# Patient Record
Sex: MALE | Race: BLACK OR AFRICAN AMERICAN | Employment: UNEMPLOYED | URBAN - METROPOLITAN AREA
[De-identification: names, ages, dates, MRNs, and addresses within clinical notes are randomized per-mention and may not be internally consistent; named-entity substitution may affect disease eponyms.]

---

## 2017-02-06 ENCOUNTER — OFFICE VISIT (OUTPATIENT)
Dept: INTERNAL MEDICINE CLINIC | Age: 67
End: 2017-02-06

## 2017-02-06 VITALS
RESPIRATION RATE: 16 BRPM | HEART RATE: 59 BPM | HEIGHT: 68 IN | SYSTOLIC BLOOD PRESSURE: 138 MMHG | WEIGHT: 147 LBS | OXYGEN SATURATION: 98 % | TEMPERATURE: 97.9 F | BODY MASS INDEX: 22.28 KG/M2 | DIASTOLIC BLOOD PRESSURE: 74 MMHG

## 2017-02-06 DIAGNOSIS — Z09 HOSPITAL DISCHARGE FOLLOW-UP: Primary | ICD-10-CM

## 2017-02-06 DIAGNOSIS — R55 SYNCOPE, UNSPECIFIED SYNCOPE TYPE: ICD-10-CM

## 2017-02-06 DIAGNOSIS — M15.9 PRIMARY OSTEOARTHRITIS INVOLVING MULTIPLE JOINTS: ICD-10-CM

## 2017-02-06 DIAGNOSIS — Z76.0 MEDICATION REFILL: ICD-10-CM

## 2017-02-06 DIAGNOSIS — I73.9 CLAUDICATION (HCC): ICD-10-CM

## 2017-02-06 RX ORDER — LANOLIN ALCOHOL/MO/W.PET/CERES
100 CREAM (GRAM) TOPICAL
COMMUNITY
Start: 2017-01-18 | End: 2018-07-05

## 2017-02-06 RX ORDER — SUCRALFATE 1 G/1
1 TABLET ORAL
COMMUNITY
Start: 2017-01-18 | End: 2017-02-17

## 2017-02-06 RX ORDER — OMEPRAZOLE 20 MG/1
20 CAPSULE, DELAYED RELEASE ORAL
COMMUNITY
Start: 2017-01-18 | End: 2018-05-09

## 2017-02-06 RX ORDER — GUAIFENESIN 100 MG/5ML
81 LIQUID (ML) ORAL
COMMUNITY
Start: 2017-01-20

## 2017-02-06 RX ORDER — OXYCODONE AND ACETAMINOPHEN 7.5; 325 MG/1; MG/1
2 TABLET ORAL
Qty: 60 TAB | Refills: 0 | Status: SHIPPED | OUTPATIENT
Start: 2017-02-06 | End: 2017-06-22

## 2017-02-06 RX ORDER — FOLIC ACID 1 MG/1
1 TABLET ORAL
COMMUNITY
Start: 2017-01-18 | End: 2018-05-09

## 2017-02-06 RX ORDER — ATORVASTATIN CALCIUM 40 MG/1
40 TABLET, FILM COATED ORAL
COMMUNITY
Start: 2017-01-20 | End: 2018-03-20 | Stop reason: SDUPTHER

## 2017-02-06 RX ORDER — ALBUTEROL SULFATE 90 UG/1
1 AEROSOL, METERED RESPIRATORY (INHALATION)
Qty: 1 INHALER | Refills: 5 | Status: SHIPPED | OUTPATIENT
Start: 2017-02-06 | End: 2017-03-07 | Stop reason: SDUPTHER

## 2017-02-06 NOTE — PROGRESS NOTES
ROOM # 3    Pt presents today for hospital follow up    Pt preferred language for health care discussion is english. Is someone accompanying this pt? no    Is the patient using any DME equipment during OV? no    Depression Screening completed. Yes    Learning Assessment completed. Yes    Abuse Screening completed. Yes    Health Maintenance reviewed and discussed per provider. Yes    Pt is due for Microalbumin, A1C, Lipid, Diabetic eye exam.  Please order/place referral if appropriate. Advance Directive:  1. Do you have an advance directive in place? Patient Reply: No    2. If not, would you like material regarding how to put one in place? Patient Reply: No    Coordination of Care:  1. Have you been to the ER, urgent care clinic since your last visit? Hospitalized since your last visit? [facility] Stroke    2. Have you seen or consulted any other health care providers outside of the [address] [place] [address] since your last visit? Include any pap smears or colon screening.  No

## 2017-02-06 NOTE — PROGRESS NOTES
HISTORY OF PRESENT ILLNESS  Josh Jasmine is a 73 y.o. male. Visit Vitals    /74    Pulse (!) 59    Temp 97.9 °F (36.6 °C) (Oral)    Resp 16    Ht 5' 8\" (1.727 m)    Wt 147 lb (66.7 kg)    SpO2 98%    BMI 22.35 kg/m2       HPI Comments: Pt in [facility] in January with syncope. ? Etiology  He had EGD and colonoscopy. 8 mm polyp removed  He had CT and MRI with PVL, then neck MRA. occlusion of right vertebral artery. Medical management advised  Pt advised to stop smoking and alcohol. He uses marijuana frequently per chart    He is back to his normal routine. Strong appetite    Notes his calves pain him on walking. Has never had testing of arterial circulation of them      Hospital Follow Up   The history is provided by the patient (see comments). This is a new problem. Pertinent negatives include no chest pain, no headaches and no shortness of breath. Review of Systems   Constitutional: Negative for chills and fever. Respiratory: Negative for cough and shortness of breath. Cardiovascular: Negative for chest pain and palpitations. Leg/calf pain with ambulation   Neurological: Negative for dizziness and headaches. Physical Exam   Constitutional: He is oriented to person, place, and time. He appears well-developed and well-nourished. No distress. Cardiovascular: Normal rate and regular rhythm. Pulmonary/Chest: Effort normal and breath sounds normal.   Musculoskeletal: He exhibits no edema or tenderness. Neurological: He is alert and oriented to person, place, and time. Skin: Skin is warm and dry. He is not diaphoretic. Psychiatric: He has a normal mood and affect. Nursing note and vitals reviewed. ASSESSMENT and PLAN    ICD-10-CM ICD-9-CM    1. Hospital discharge follow-up Z09 V67.59    2. Syncope, unspecified syncope type R55 780.2    3. Primary osteoarthritis involving multiple joints M15.0 715.09    4. Claudication (HCC) I73.9 443.9 DUPLEX LO EXTREM ART BILAT   5. Medication refill Z76.0 V68.1 albuterol (PROVENTIL HFA, VENTOLIN HFA, PROAIR HFA) 90 mcg/actuation inhaler      oxyCODONE-acetaminophen (PERCOCET) 7.5-325 mg per tablet      oxyCODONE-acetaminophen (PERCOCET) 7.5-325 mg per tablet      oxyCODONE-acetaminophen (PERCOCET) 7.5-325 mg per tablet       Doing well after the hospital. No ongoing needs identified. No further sxs. Available hospital record reviewed. Discussed the vascular disease in his neck. Puts him at risk for PAD in his legs as well.  Needs PVL of BLE    Discussed smoking and taking his meds properly, nely his cholesterol meds    F/u 3 months for BP and chol and pain med refills

## 2017-02-06 NOTE — MR AVS SNAPSHOT
Visit Information Date & Time Provider Department Dept. Phone Encounter #  
 2/6/2017  3:00 PM Mirna MoodyTantaline [phone] Follow-up Instructions Return in about 3 months (around 5/6/2017) for Medicare Wellness Visit, cholesterol, htn, chronic pain, Med refills. Upcoming Health Maintenance Date Due FOBT Q 1 YEAR AGE 50-75 3/3/2001 GLAUCOMA SCREENING Q2Y 3/3/2016 Pneumococcal 65+ Low/Medium Risk (1 of 2 - PCV13) 3/3/2016 MEDICARE YEARLY EXAM 3/3/2016 LIPID PANEL Q1 10/3/2016 HEMOGLOBIN A1C Q6M 1/11/2017 DTaP/Tdap/Td series (1 - Tdap) 2/6/2018* EYE EXAM RETINAL OR DILATED Q1 2/6/2018* FOOT EXAM Q1 7/11/2017 MICROALBUMIN Q1 7/11/2017 *Topic was postponed. The date shown is not the original due date. Allergies as of 2/6/2017  Review Complete On: 2/6/2017 By: Mirna Moody MD  
 No Known Allergies Current Immunizations  Reviewed on 12/26/2014 Name Date Influenza Vaccine 9/12/2016 Influenza Vaccine Donna Crafts) 10/3/2015 Influenza Vaccine PF 12/26/2014  2:06 PM  
  
 Not reviewed this visit You Were Diagnosed With   
  
 Codes Comments Hospital discharge follow-up    -  Primary ICD-10-CM: [address] ICD-9-CM: V67.59 Syncope, unspecified syncope type     ICD-10-CM: R55 
ICD-9-CM: 780.2 Primary osteoarthritis involving multiple joints     ICD-10-CM: M15.0 ICD-9-CM: 715.09 Claudication [facility])     ICD-10-CM: I73.9 ICD-9-CM: 443.9 Medication refill     ICD-10-CM: Z76.0 ICD-9-CM: V68.1 Vitals BP Pulse Temp Resp Height(growth percentile) Weight(growth percentile) 138/74 (!) 59 97.9 °F (36.6 °C) (Oral) 16 5' 8\" (1.727 m) 147 lb (66.7 kg) SpO2 BMI Smoking Status 98% 22.35 kg/m2 Former Smoker BMI and BSA Data Body Mass Index Body Surface Area  
 22.35 kg/m 2 1.79 m 2 Preferred Pharmacy Pharmacy Name Phone [facility], [address] [place] [address] [phone] Your Updated Medication List  
  
   
This list is accurate as of: 2/6/17  3:58 PM.  Always use your most recent med list.  
  
  
  
  
 albuterol 90 mcg/actuation inhaler Commonly known as:  PROVENTIL HFA, VENTOLIN HFA, PROAIR HFA Take 1 Puff by inhalation every six (6) hours as needed for Wheezing. aspirin 81 mg chewable tablet 81 mg.  
  
 atorvastatin 40 mg tablet Commonly known as:  LIPITOR 40 mg.  
  
 fluticasone 50 mcg/actuation nasal spray Commonly known as:  [place] 2 Sprays by Both Nostrils route daily. Indications: ALLERGIC RHINITIS  
  
 folic acid 1 mg tablet Commonly known as:  FOLVITE  
1 mg.  
  
 losartan 50 mg tablet Commonly known as:  COZAAR Take 1 Tab by mouth daily. metFORMIN 500 mg tablet Commonly known as:  GLUCOPHAGE Take 1 Tab by mouth two (2) times daily (with meals). omeprazole 20 mg capsule Commonly known as:  PRILOSEC  
20 mg.  
  
 * oxyCODONE-acetaminophen 7.5-325 mg per tablet Commonly known as:  PERCOCET Take 2 Tabs by mouth every four (4) hours as needed for Pain. * oxyCODONE-acetaminophen 7.5-325 mg per tablet Commonly known as:  PERCOCET Take 2 Tabs by mouth every four (4) hours as needed for Pain. * oxyCODONE-acetaminophen 7.5-325 mg per tablet Commonly known as:  PERCOCET Take 2 Tabs by mouth every four (4) hours as needed for Pain.  
  
 sildenafil citrate 100 mg tablet Commonly known as:  VIAGRA Take 1 Tab by mouth as needed. Indications: ERECTILE DYSFUNCTION  
  
 sucralfate 1 gram tablet Commonly known as:  CARAFATE  
1 g.  
  
 therapeutic multivitamin-minerals tablet Commonly known as:  THERAGRAN-M Take 1 Tab by Mouth Once a Day. thiamine 100 mg tablet Commonly known as:  B-1  
100 mg.  
  
 * Notice:   This list has 3 medication(s) that are the same as other medications prescribed for you. Read the directions carefully, and ask your doctor or other care provider to review them with you. Prescriptions Printed Refills  
 oxyCODONE-acetaminophen (PERCOCET) 7.5-325 mg per tablet 0 Sig: Take 2 Tabs by mouth every four (4) hours as needed for Pain. Class: Print Route: Oral  
  
Prescriptions Sent to Pharmacy Refills  
 albuterol (PROVENTIL HFA, VENTOLIN HFA, PROAIR HFA) 90 mcg/actuation inhaler 5 Sig: Take 1 Puff by inhalation every six (6) hours as needed for Wheezing. Class: Normal  
 Pharmacy: [address] [place] [address] #: [phone] Route: Inhalation Follow-up Instructions Return in about 3 months (around 5/6/2017) for Medicare Wellness Visit, cholesterol, htn, chronic pain, Med refills. To-Do List   
 02/06/2017 Imaging:  DUPLEX LO EXTREM ART BILAT Introducing [facility] & HEALTH SERVICES! Kristi Owen introduces [email or web address] patient portal. Now you can access parts of your medical record, email your doctor's office, and request medication refills online. 1. In your internet browser, go to [email or web address] 2. Click on the First Time User? Click Here link in the Sign In box. You will see the New Member Sign Up page. 3. Enter your [email or web address] Access Code exactly as it appears below. You will not need to use this code after youve completed the sign-up process. If you do not sign up before the expiration date, you must request a new code. · [email or web address] Access Code: [email or web address] Expires: 5/7/2017  3:56 PM 
 
 4. Enter the last four digits of your Social Security Number (xxxx) and Date of Birth (mm/dd/yyyy) as indicated and click Submit. You will be taken to the next sign-up page. 5. Create a [email or web address] ID. This will be your [email or web address] login ID and cannot be changed, so think of one that is secure and easy to remember. 6. Create a [email or web address] password. You can change your password at any time. 7. Enter your Password Reset Question and Answer. This can be used at a later time if you forget your password. 8. Enter your e-mail address. You will receive e-mail notification when new information is available in [address] 9. Click Sign Up. You can now view and download portions of your medical record. 10. Click the Download Summary menu link to download a portable copy of your medical information. If you have questions, please visit the Frequently Asked Questions section of the [email or web address] website. Remember, [email or web address] is NOT to be used for urgent needs. For medical emergencies, dial 911. Now available from your iPhone and Android! Please provide this summary of care documentation to your next provider. Your primary care clinician is listed as [facility]. If you have any questions after today's visit, please call [phone].

## 2017-03-07 DIAGNOSIS — Z76.0 MEDICATION REFILL: ICD-10-CM

## 2017-03-07 RX ORDER — ALBUTEROL SULFATE 90 UG/1
1 AEROSOL, METERED RESPIRATORY (INHALATION)
Qty: 1 INHALER | Refills: 5 | Status: SHIPPED | OUTPATIENT
Start: 2017-03-07 | End: 2018-10-18

## 2017-03-07 NOTE — TELEPHONE ENCOUNTER
Requested Prescriptions     Pending Prescriptions Disp Refills    albuterol (PROVENTIL HFA, VENTOLIN HFA, PROAIR HFA) 90 mcg/actuation inhaler 1 Inhaler 5     Sig: Take 1 Puff by inhalation every six (6) hours as needed for Wheezing.

## 2017-03-17 DIAGNOSIS — Z76.0 MEDICATION REFILL: ICD-10-CM

## 2017-03-18 NOTE — TELEPHONE ENCOUNTER
Called pt 2 pt identifiers confirmed. Asked pt what results he was asking about he stated ultrasound stated understanding. Report from bryan has been scanned in, pt also stated that he has been out of his losartan for a week stated understanding and informed pt that will route call to pcp pt stated understanding no other questions or concerns noted at this time.

## 2017-03-18 NOTE — TELEPHONE ENCOUNTER
Called pt pharmacy spoke with pharmacist who stated pt has 1 refill left on his losartan stated understanding and asked that he go ahead and get med ready for pt he stated understanding no other questions or concerns noted at this time.

## 2017-03-18 NOTE — TELEPHONE ENCOUNTER
Called pt informed of below he stated understanding. No other questions or concerns noted at this time. Dr peacock please advise in regards to refills on pt Losartan and in regards to the results of pt most recent imaging. Requested Prescriptions     Pending Prescriptions Disp Refills    losartan (COZAAR) 50 mg tablet 30 Tab 5     Sig: Take 1 Tab by mouth daily.

## 2017-03-19 RX ORDER — LOSARTAN POTASSIUM 50 MG/1
50 TABLET ORAL DAILY
Qty: 30 TAB | Refills: 5 | Status: SHIPPED | OUTPATIENT
Start: 2017-03-19 | End: 2017-08-06

## 2017-06-12 ENCOUNTER — OFFICE VISIT (OUTPATIENT)
Dept: INTERNAL MEDICINE CLINIC | Age: 67
End: 2017-06-12

## 2017-06-12 VITALS
SYSTOLIC BLOOD PRESSURE: 110 MMHG | OXYGEN SATURATION: 97 % | RESPIRATION RATE: 16 BRPM | DIASTOLIC BLOOD PRESSURE: 70 MMHG | HEART RATE: 61 BPM | TEMPERATURE: 96.9 F | HEIGHT: 68 IN | BODY MASS INDEX: 19.85 KG/M2 | WEIGHT: 131 LBS

## 2017-06-12 DIAGNOSIS — R40.20 LOSS OF CONSCIOUSNESS (HCC): ICD-10-CM

## 2017-06-12 DIAGNOSIS — R63.4 ABNORMAL WEIGHT LOSS: ICD-10-CM

## 2017-06-12 DIAGNOSIS — E11.9 CONTROLLED TYPE 2 DIABETES MELLITUS WITHOUT COMPLICATION, WITHOUT LONG-TERM CURRENT USE OF INSULIN (HCC): ICD-10-CM

## 2017-06-12 DIAGNOSIS — R42 DIZZINESS: Primary | ICD-10-CM

## 2017-06-12 LAB — GLUCOSE POC: 110 MG/DL

## 2017-06-12 NOTE — PATIENT INSTRUCTIONS

## 2017-06-12 NOTE — MR AVS SNAPSHOT
Visit Information Date & Time Provider Department Dept. Phone Encounter #  
 6/12/2017  1:45 PM Miranda Carlson NP [address] [phone] [id number] Follow-up Instructions Return if symptoms worsen or fail to improve. Upcoming Health Maintenance Date Due  
 GLAUCOMA SCREENING Q2Y 3/3/2016 Pneumococcal 65+ Low/Medium Risk (1 of 2 - PCV13) 3/3/2016 MEDICARE YEARLY EXAM 3/3/2016 LIPID PANEL Q1 10/3/2016 HEMOGLOBIN A1C Q6M 1/11/2017 FOOT EXAM Q1 7/11/2017 MICROALBUMIN Q1 7/11/2017 DTaP/Tdap/Td series (1 - Tdap) 2/6/2018* EYE EXAM RETINAL OR DILATED Q1 2/6/2018* INFLUENZA AGE 9 TO ADULT 8/1/2017 COLONOSCOPY 1/20/2020 *Topic was postponed. The date shown is not the original due date. Allergies as of 6/12/2017  Review Complete On: 6/12/2017 By: Bridgette Kelly LPN No Known Allergies Current Immunizations  Reviewed on 12/26/2014 Name Date Influenza Vaccine 9/12/2016 Influenza Vaccine Louellen Mohler) 10/3/2015 Influenza Vaccine PF 12/26/2014  2:06 PM  
  
 Not reviewed this visit You Were Diagnosed With   
  
 Codes Comments Dizziness    -  Primary ICD-10-CM: D16 ICD-9-CM: 780.4 Loss of consciousness ([facility]     ICD-10-CM: R40.20 ICD-9-CM: 780.09 Controlled type 2 diabetes mellitus without complication, without long-term current use of insulin ([facility]     ICD-10-CM: E11.9 ICD-9-CM: 250.00 Abnormal weight loss     ICD-10-CM: R63.4 ICD-9-CM: 783.21 Vitals BP Pulse Temp Resp Height(growth percentile) Weight(growth percentile) 110/70 (BP 1 Location: Right arm, BP Patient Position: Sitting) 61 96.9 °F (36.1 °C) (Oral) 16 5' 8\" (1.727 m) 131 lb (59.4 kg) SpO2 BMI Smoking Status 97% 19.92 kg/m2 Former Smoker Vitals History BMI and BSA Data Body Mass Index Body Surface Area  
 19.92 kg/m 2 1.69 m 2 Preferred Pharmacy Pharmacy Name Phone [facility], [address] [place] [address] [phone] Your Updated Medication List  
  
   
This list is accurate as of: 6/12/17  2:44 PM.  Always use your most recent med list.  
  
  
  
  
 albuterol 90 mcg/actuation inhaler Commonly known as:  PROVENTIL HFA, VENTOLIN HFA, PROAIR HFA Take 1 Puff by inhalation every six (6) hours as needed for Wheezing. aspirin 81 mg chewable tablet 81 mg.  
  
 atorvastatin 40 mg tablet Commonly known as:  LIPITOR 40 mg.  
  
 fluticasone 50 mcg/actuation nasal spray Commonly known as:  [place] 2 Sprays by Both Nostrils route daily. Indications: ALLERGIC RHINITIS  
  
 folic acid 1 mg tablet Commonly known as:  FOLVITE  
1 mg.  
  
 losartan 50 mg tablet Commonly known as:  COZAAR Take 1 Tab by mouth daily. metFORMIN 500 mg tablet Commonly known as:  GLUCOPHAGE Take 1 Tab by mouth two (2) times daily (with meals). omeprazole 20 mg capsule Commonly known as:  PRILOSEC  
20 mg.  
  
 * oxyCODONE-acetaminophen 7.5-325 mg per tablet Commonly known as:  PERCOCET Take 2 Tabs by mouth every four (4) hours as needed for Pain. * oxyCODONE-acetaminophen 7.5-325 mg per tablet Commonly known as:  PERCOCET Take 2 Tabs by mouth every four (4) hours as needed for Pain. * oxyCODONE-acetaminophen 7.5-325 mg per tablet Commonly known as:  PERCOCET Take 2 Tabs by mouth every four (4) hours as needed for Pain.  
  
 sildenafil citrate 100 mg tablet Commonly known as:  VIAGRA Take 1 Tab by mouth as needed. Indications: ERECTILE DYSFUNCTION  
  
 therapeutic multivitamin-minerals tablet Commonly known as:  THERAGRAN-M Take 1 Tab by Mouth Once a Day. thiamine 100 mg tablet Commonly known as:  B-1  
100 mg.  
  
 * Notice: This list has 3 medication(s) that are the same as other medications prescribed for you.  Read the directions carefully, and ask your doctor or other care provider to review them with you. We Performed the Following AMB POC EKG ROUTINE W/ 12 LEADS, INTER & REP [90296 CPT(R)] AMB POC GLUCOSE BLOOD, BY GLUCOSE MONITORING DEVICE [84886 CPT(R)] REFERRAL TO EMERGENCY DEPARTMENT [PFK700 Custom] Comments:  
 Please evaluate patient for recurrent syncopal episodes. Follow-up Instructions Return if symptoms worsen or fail to improve. Referral Information Referral ID Referred By Referred To  
  
 [id number] RIAZ ROSALES Not Available Visits Status Start Date End Date 1 New Request 6/12/17 6/12/18 If your referral has a status of pending review or denied, additional information will be sent to support the outcome of this decision. Patient Instructions Fainting: Care Instructions Your Care Instructions When you faint, or pass out, you lose consciousness for a short time. A brief drop in blood flow to the brain often causes it. When you fall or lie down, more blood flows to your brain and you regain consciousness. Emotional stress, pain, or overheatingespecially if you have been standingcan make you faint. In these cases, fainting is usually not serious. But fainting can be a sign of a more serious problem. Your doctor may want you to have more tests to rule out other causes. The treatment you need depends on the reason why you fainted. The doctor has checked you carefully, but problems can develop later. If you notice any problems or new symptoms, get medical treatment right away. Follow-up care is a key part of your treatment and safety. Be sure to make and go to all appointments, and call your doctor if you are having problems. It's also a good idea to know your test results and keep a list of the medicines you take. How can you care for yourself at home? · Drink plenty of fluids to prevent dehydration.  If you have kidney, heart, or liver disease and have to limit fluids, talk with your doctor before you increase your fluid intake. When should you call for help? Call 911 anytime you think you may need emergency care. For example, call if: 
· You have symptoms of a heart problem. These may include: ¨ Chest pain or pressure. ¨ Severe trouble breathing. ¨ A fast or irregular heartbeat. ¨ Lightheadedness or sudden weakness. ¨ Coughing up pink, foamy mucus. ¨ Passing out. After you call 911, the  may tell you to chew 1 adult-strength or 2 to 4 low-dose aspirin. Wait for an ambulance. Do not try to drive yourself. · You have symptoms of a stroke. These may include: 
¨ Sudden numbness, tingling, weakness, or loss of movement in your face, arm, or leg, especially on only one side of your body. ¨ Sudden vision changes. ¨ Sudden trouble speaking. ¨ Sudden confusion or trouble understanding simple statements. ¨ Sudden problems with walking or balance. ¨ A sudden, severe headache that is different from past headaches. · You passed out (lost consciousness) again. Watch closely for changes in your health, and be sure to contact your doctor if: 
· You do not get better as expected. Where can you learn more? Go to http://don-peggy.info/. Enter Z558 in the search box to learn more about \"Fainting: Care Instructions. \" Current as of: May 27, 2016 Content Version: 11.2 © 3927-8835 Flypaper. Care instructions adapted under license by [employer] (which disclaims liability or warranty for this information). If you have questions about a medical condition or this instruction, always ask your healthcare professional. [address] any warranty or liability for your use of this information. Introducing [facility] & HE[facility] SERVICES!    
 Veena Swift introduces [employer] patient portal. Now you can access parts of your medical record, email your doctor's office, and request medication refills online. 1. In your internet browser, go to [email or web address] Optima 2. Click on the First Time User? Click Here link in the Sign In box. You will see the New Member Sign Up page. 3. Enter your Membersuite Access Code exactly as it appears below. You will not need to use this code after youve completed the sign-up process. If you do not sign up before the expiration date, you must request a new code. · Membersuite Access Code: [email or web address] Expires: 9/10/2017  2:44 PM 
 
4. Enter the last four digits of your Social Security Number (xxxx) and Date of Birth (mm/dd/yyyy) as indicated and click Submit. You will be taken to the next sign-up page. 5. Create a Membersuite ID. This will be your Membersuite login ID and cannot be changed, so think of one that is secure and easy to remember. 6. Create a Membersuite password. You can change your password at any time. 7. Enter your Password Reset Question and Answer. This can be used at a later time if you forget your password. 8. Enter your e-mail address. You will receive e-mail notification when new information is available in [address] 9. Click Sign Up. You can now view and download portions of your medical record. 10. Click the Download Summary menu link to download a portable copy of your medical information. If you have questions, please visit the Frequently Asked Questions section of the Membersuite website. Remember, Membersuite is NOT to be used for urgent needs. For medical emergencies, dial 911. Now available from your iPhone and Android! Please provide this summary of care documentation to your next provider. Your primary care clinician is listed as [facility]. If you have any questions after today's visit, please call [phone].

## 2017-06-12 NOTE — PROGRESS NOTES
Patient  With an extensive neurological history presents with intermittent LOC and multiple TIA since 2014, states he has had increasing syncopal episodes lately, states he had two episodes this week, last episode yesterday\"after Shinto\". Patient denies any recent head injury,using illegal drugs or using alcohol. Patient denies any HA,blurry vision, unilateral weakness, slurred speech,facial drooling,drooping, NV,numbness,tingling,palpitations, malaise,SOB,CP. Patient not a good historian. Negative [place]. Patient BS WNL and EKG consistent with old EKG on file. Patient was recently admitted at [facility] with a full neuro work up including CTA neck and head. (see care everywhere notes) . Referred patient to ED for a full neuro work up.

## 2017-06-22 ENCOUNTER — OFFICE VISIT (OUTPATIENT)
Dept: INTERNAL MEDICINE CLINIC | Age: 67
End: 2017-06-22

## 2017-06-22 VITALS
HEIGHT: 68 IN | BODY MASS INDEX: 20.31 KG/M2 | WEIGHT: 134 LBS | DIASTOLIC BLOOD PRESSURE: 68 MMHG | OXYGEN SATURATION: 100 % | SYSTOLIC BLOOD PRESSURE: 144 MMHG | RESPIRATION RATE: 16 BRPM | HEART RATE: 57 BPM | TEMPERATURE: 97.7 F

## 2017-06-22 DIAGNOSIS — I10 ESSENTIAL HYPERTENSION: Chronic | ICD-10-CM

## 2017-06-22 DIAGNOSIS — E11.9 TYPE 2 DIABETES MELLITUS WITHOUT COMPLICATION, WITHOUT LONG-TERM CURRENT USE OF INSULIN (HCC): Chronic | ICD-10-CM

## 2017-06-22 DIAGNOSIS — Z79.891 LONG TERM (CURRENT) USE OF OPIATE ANALGESIC: ICD-10-CM

## 2017-06-22 DIAGNOSIS — R55 SYNCOPE, UNSPECIFIED SYNCOPE TYPE: ICD-10-CM

## 2017-06-22 DIAGNOSIS — Z76.0 MEDICATION REFILL: ICD-10-CM

## 2017-06-22 DIAGNOSIS — Z09 HOSPITAL DISCHARGE FOLLOW-UP: Primary | ICD-10-CM

## 2017-06-22 LAB
ALCOHOL UR POC: NORMAL
AMPHETAMINES UR POC: NEGATIVE
BARBITURATES UR POC: NEGATIVE
BENZODIAZEPINES UR POC: NORMAL
BUPRENORPHINE UR POC: NORMAL
CANNABINOIDS UR POC: NORMAL
CARISOPRODOL UR POC: NORMAL
COCAINE UR POC: NEGATIVE
FENTANYL UR POC: NORMAL
MDMA/ECSTASY UR POC: NEGATIVE
METHADONE UR POC: NEGATIVE
METHAMPHETAMINE UR POC: NEGATIVE
METHYLPHENIDATE UR POC: NORMAL
OPIATES UR POC: NEGATIVE
OXYCODONE UR POC: NEGATIVE
PHENCYCLIDINE UR POC: NEGATIVE
PROPOXYPHENE UR POC: NORMAL
TRAMADOL UR POC: NORMAL
TRICYCLICS UR POC: NEGATIVE

## 2017-06-22 RX ORDER — OXYCODONE AND ACETAMINOPHEN 5; 325 MG/1; MG/1
2 TABLET ORAL
Qty: 60 TAB | Refills: 0 | Status: SHIPPED | OUTPATIENT
Start: 2017-06-22 | End: 2018-01-24

## 2017-06-22 NOTE — PROGRESS NOTES
HISTORY OF PRESENT ILLNESS  Jad Tenorio is a 78 y.o. male. Visit Vitals    /68    Pulse (!) 57    Temp 97.7 °F (36.5 °C) (Oral)    Resp 16    Ht 5' 8\" (1.727 m)    Wt 134 lb (60.8 kg)    SpO2 100%    BMI 20.37 kg/m2       HPI Comments: Pt was in the hospital with syncopal spell. They did numerous tests and did not find anything. He had cardiac evaluation in January of this year. 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 GI has seen him and found nothing. He is concerned regarding chromium toxicity from joint replacements--his chromium level was high in 2013    Hospital Follow Up   The history is provided by the patient (see comments). This is a new problem. Pertinent negatives include no chest pain, no headaches and no shortness of breath. Review of Systems   Constitutional: Negative for chills and fever. Respiratory: Negative for shortness of breath. Cardiovascular: Negative for chest pain and palpitations. Gastrointestinal:        Wt up 3# today   Neurological: Positive for dizziness. Negative for tremors, sensory change, focal weakness and headaches. Physical Exam   Constitutional: He is oriented to person, place, and time. He appears well-developed and well-nourished. No distress. Cardiovascular: Normal rate, regular rhythm and normal heart sounds. Pulmonary/Chest: Effort normal and breath sounds normal.   Musculoskeletal: He exhibits no edema. Neurological: He is alert and oriented to person, place, and time. Finger to nose intact   Skin: Skin is warm and dry. He is not diaphoretic. Psychiatric: He has a normal mood and affect. Nursing note and vitals reviewed. ASSESSMENT and PLAN    ICD-10-CM ICD-9-CM    1. Hospital discharge follow-up Z09 V67.59    2.  Syncope, unspecified syncope type R55 780.2 REFERRAL TO NEUROLOGY   3. Type 2 diabetes mellitus without complication, without long-term current use of insulin (HCC) E11.9 250.00 REFERRAL TO NEUROLOGY      REFERRAL TO OPHTHALMOLOGY   4. Essential hypertension I10 401.9    5. Medication refill Z76.0 V68.1 oxyCODONE-acetaminophen (PERCOCET) 5-325 mg per tablet      oxyCODONE-acetaminophen (PERCOCET) 5-325 mg per tablet      oxyCODONE-acetaminophen (PERCOCET) 5-325 mg per tablet   6. Long term (current) use of opiate analgesic Z79.891 V58.69 AMB POC DRUG SCREEN ()         Available hospital record reviewed     reviewed and appropriate activity noted. No adverse activity noted. UDS ordered  + for THC. Pt given warning and advised this is the only warning re this    Refills as noted    Referral as noted    F/u 6 weeks for MWV, DM, HTN      Brother-in-law informed me that his sister had given pt a xanax as he is very stressed right now. He is about to be homeless. He lost his disability 2 years ago (he got it due to complications of his hip surgery). He is not confiding in his family as he should.

## 2017-06-22 NOTE — MR AVS SNAPSHOT
Visit Information Date & Time Provider Department Dept. Phone Encounter #  
 6/22/2017  3:00 PM [facility] [phone] [id number] Follow-up Instructions Return in about 6 weeks (around 8/3/2017) for Medicare Wellness Visit, HTN, DM, cholesterol. Upcoming Health Maintenance Date Due  
 GLAUCOMA SCREENING Q2Y 3/3/2016 Pneumococcal 65+ Low/Medium Risk (1 of 2 - PCV13) 3/3/2016 MEDICARE YEARLY EXAM 3/3/2016 LIPID PANEL Q1 10/3/2016 HEMOGLOBIN A1C Q6M 1/11/2017 FOOT EXAM Q1 7/11/2017 MICROALBUMIN Q1 7/11/2017 DTaP/Tdap/Td series (1 - Tdap) 2/6/2018* EYE EXAM RETINAL OR DILATED Q1 2/6/2018* INFLUENZA AGE 9 TO ADULT 8/1/2017 COLONOSCOPY 1/20/2020 *Topic was postponed. The date shown is not the original due date. Allergies as of 6/22/2017  Review Complete On: 6/22/2017 By: Jose Luis Cao MD  
 No Known Allergies Current Immunizations  Reviewed on 12/26/2014 Name Date Influenza Vaccine 9/12/2016 Influenza Vaccine Elane Sridevi) 10/3/2015 Influenza Vaccine PF 12/26/2014  2:06 PM  
  
 Not reviewed this visit You Were Diagnosed With   
  
 Codes Comments Hospital discharge follow-up    -  Primary ICD-10-CM: [facility] ICD-9-CM: V67.59 Syncope, unspecified syncope type     ICD-10-CM: R55 
ICD-9-CM: 780. 2 Type 2 diabetes mellitus without complication, without long-term current use of insulin (HCC)     ICD-10-CM: E11.9 ICD-9-CM: 250.00 Essential hypertension     ICD-10-CM: I10 
ICD-9-CM: 401.9 Medication refill     ICD-10-CM: Z76.0 ICD-9-CM: V68.1 Long term (current) use of opiate analgesic     ICD-10-CM: F77.009 ICD-9-CM: V58.69 Vitals BP Pulse Temp Resp Height(growth percentile) Weight(growth percentile) 144/68 (!) 57 97.7 °F (36.5 °C) (Oral) 16 5' 8\" (1.727 m) 134 lb (60.8 kg) SpO2 BMI Smoking Status 100% 20.37 kg/m2 Former Smoker BMI and BSA Data Body Mass Index Body Surface Area  
 20.37 kg/m 2 1.71 m 2 Preferred Pharmacy Pharmacy Name Phone [address] [facility], [address] [place] [address] [phone] Your Updated Medication List  
  
   
This list is accurate as of: 6/22/17  3:43 PM.  Always use your most recent med list.  
  
  
  
  
 albuterol 90 mcg/actuation inhaler Commonly known as:  PROVENTIL HFA, VENTOLIN HFA, PROAIR HFA Take 1 Puff by inhalation every six (6) hours as needed for Wheezing. aspirin 81 mg chewable tablet 81 mg.  
  
 atorvastatin 40 mg tablet Commonly known as:  LIPITOR 40 mg.  
  
 fluticasone 50 mcg/actuation nasal spray Commonly known as:  Carjai Yoousick 2 Sprays by Both Nostrils route daily. Indications: ALLERGIC RHINITIS  
  
 folic acid 1 mg tablet Commonly known as:  FOLVITE  
1 mg.  
  
 losartan 50 mg tablet Commonly known as:  COZAAR Take 1 Tab by mouth daily. metFORMIN 500 mg tablet Commonly known as:  GLUCOPHAGE Take 1 Tab by mouth two (2) times daily (with meals). omeprazole 20 mg capsule Commonly known as:  PRILOSEC  
20 mg.  
  
 * oxyCODONE-acetaminophen 5-325 mg per tablet Commonly known as:  PERCOCET Take 2 Tabs by mouth every four (4) hours as needed for Pain. Max Daily Amount: 12 Tabs. * oxyCODONE-acetaminophen 5-325 mg per tablet Commonly known as:  PERCOCET Take 2 Tabs by mouth every four (4) hours as needed for Pain. Max Daily Amount: 12 Tabs. * oxyCODONE-acetaminophen 5-325 mg per tablet Commonly known as:  PERCOCET Take 2 Tabs by mouth every four (4) hours as needed for Pain. Max Daily Amount: 12 Tabs.  
  
 sildenafil citrate 100 mg tablet Commonly known as:  VIAGRA Take 1 Tab by mouth as needed. Indications: ERECTILE DYSFUNCTION  
  
 therapeutic multivitamin-minerals tablet Commonly known as:  THERAGRAN-M Take 1 Tab by Mouth Once a Day. thiamine 100 mg tablet Commonly known as:  B-1  
100 mg.  
  
 * Notice: This list has 3 medication(s) that are the same as other medications prescribed for you. Read the directions carefully, and ask your doctor or other care provider to review them with you. Prescriptions Printed Refills  
 oxyCODONE-acetaminophen (PERCOCET) 5-325 mg per tablet 0 Sig: Take 2 Tabs by mouth every four (4) hours as needed for Pain. Max Daily Amount: 12 Tabs. Class: Print Route: Oral  
  
We Performed the Following REFERRAL TO NEUROLOGY [TCF11 Custom] Comments:  
 Please evaluate patient for possible neurogenic syncope. Pt recently in [facility] with records there REFERRAL TO OPHTHALMOLOGY [REF57 Custom] Comments:  
 Please evaluate patient for diabetic eye exam.  
  
Follow-up Instructions Return in about 6 weeks (around 8/3/2017) for Medicare Wellness Visit, HTN, DM, cholesterol. To-Do List   
 06/22/2017 Lab:  Merril Proper 13 (MW)   
  
  
Referral Information Referral ID Referred By Referred To  
  
 [id number] Esdras Flores MD   
   [id number] [facility] Phone: [phone] Fax: [phone] Visits Status Start Date End Date 1 New Request 6/22/17 6/22/18 If your referral has a status of pending review or denied, additional information will be sent to support the outcome of this decision. Referral ID Referred By Referred To  
 [id number] [address], MD Сергей Wade [facility], [address] [place] [address] Phone: [phone] Fax: [phone] Visits Status Start Date End Date 1 New Request 6/22/17 6/22/18 If your referral has a status of pending review or denied, additional information will be sent to support the outcome of this decision. Introducing [facility]! [facility] [facility] introduces [employer]peas patient portal. Now you can access parts of your medical record, email your doctor's office, and request medication refills online. 1. In your internet browser, go to [email or web address] 2. Click on the First Time User? Click Here link in the Sign In box. You will see the New Member Sign Up page. 3. Enter your NETpeas Access Code exactly as it appears below. You will not need to use this code after youve completed the sign-up process. If you do not sign up before the expiration date, you must request a new code. · NETpeas Access Code: [email or web address] Expires: 9/10/2017  2:44 PM 
 
4. Enter the last four digits of your Social Security Number (xxxx) and Date of Birth (mm/dd/yyyy) as indicated and click Submit. You will be taken to the next sign-up page. 5. Create a NETpeas ID. This will be your NETpeas login ID and cannot be changed, so think of one that is secure and easy to remember. 6. Create a NETpeas password. You can change your password at any time. 7. Enter your Password Reset Question and Answer. This can be used at a later time if you forget your password. 8. Enter your e-mail address. You will receive e-mail notification when new information is available in [address] 9. Click Sign Up. You can now view and download portions of your medical record. 10. Click the Download Summary menu link to download a portable copy of your medical information. If you have questions, please visit the Frequently Asked Questions section of the NETpeas website. Remember, NETpeas is NOT to be used for urgent needs. For medical emergencies, dial 911. Now available from your iPhone and Android! Please provide this summary of care documentation to your next provider. Your primary care clinician is listed as [facility]. If you have any questions after today's visit, please call [phone].

## 2017-08-05 DIAGNOSIS — Z76.0 MEDICATION REFILL: ICD-10-CM

## 2017-08-06 RX ORDER — LOSARTAN POTASSIUM 50 MG/1
TABLET ORAL
Qty: 30 TAB | Refills: 5 | Status: SHIPPED | OUTPATIENT
Start: 2017-08-06 | End: 2018-01-09 | Stop reason: DRUGHIGH

## 2017-11-10 ENCOUNTER — HOSPITAL ENCOUNTER (OUTPATIENT)
Dept: LAB | Age: 67
Discharge: HOME | End: 2017-11-10

## 2017-11-10 ENCOUNTER — OFFICE VISIT (OUTPATIENT)
Dept: INTERNAL MEDICINE CLINIC | Age: 67
End: 2017-11-10

## 2017-11-10 VITALS
WEIGHT: 142 LBS | RESPIRATION RATE: 18 BRPM | DIASTOLIC BLOOD PRESSURE: 85 MMHG | HEIGHT: 68 IN | SYSTOLIC BLOOD PRESSURE: 161 MMHG | OXYGEN SATURATION: 98 % | HEART RATE: 60 BPM | BODY MASS INDEX: 21.52 KG/M2 | TEMPERATURE: 96.5 F

## 2017-11-10 DIAGNOSIS — Z09 HOSPITAL DISCHARGE FOLLOW-UP: Primary | ICD-10-CM

## 2017-11-10 DIAGNOSIS — R26.81 UNSTEADINESS ON FEET: ICD-10-CM

## 2017-11-10 DIAGNOSIS — E11.9 TYPE 2 DIABETES MELLITUS WITHOUT COMPLICATION, WITHOUT LONG-TERM CURRENT USE OF INSULIN (HCC): Chronic | ICD-10-CM

## 2017-11-10 DIAGNOSIS — I73.9 PERIPHERAL VASCULAR DISEASE (HCC): ICD-10-CM

## 2017-11-10 DIAGNOSIS — I10 ESSENTIAL HYPERTENSION: Chronic | ICD-10-CM

## 2017-11-10 DIAGNOSIS — M79.2 NEUROPATHIC PAIN: ICD-10-CM

## 2017-11-10 PROCEDURE — 99001 SPECIMEN HANDLING PT-LAB: CPT | Performed by: INTERNAL MEDICINE

## 2017-11-10 RX ORDER — GABAPENTIN 300 MG/1
CAPSULE ORAL
Qty: 90 CAP | Refills: 1 | Status: SHIPPED | OUTPATIENT
Start: 2017-11-10 | End: 2018-01-24 | Stop reason: ALTCHOICE

## 2017-11-10 NOTE — PROGRESS NOTES
HISTORY OF PRESENT ILLNESS  Tamera Caicedo is a 78 y.o. male. Visit Vitals    /85 (BP 1 Location: Left arm, BP Patient Position: Sitting)    Pulse 60    Temp 96.5 °F (35.8 °C) (Oral)    Resp 18    Ht 5' 8\" (1.727 m)    Wt 142 lb (64.4 kg)    SpO2 98%    BMI 21.59 kg/m2       HPI Comments: Mr. 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 He reports some continued pain and tenderness in his LLE, which is improved with elevation and worsened by standing upright, described as a cramping sensation to his upper thigh. He also c/o some paresthesias in his distal LLE, worsened by sitting and certain positions. Pt also c/o nausea. Hospital Follow Up   The history is provided by the patient. This is a new problem. The problem has been gradually improving. Pertinent negatives include no chest pain and no shortness of breath. The symptoms are aggravated by standing. The symptoms are relieved by laying down. Review of Systems   Constitutional: Negative for chills and fever. Respiratory: Negative for shortness of breath. Cardiovascular: Negative for chest pain. Gastrointestinal: Positive for nausea. Musculoskeletal:        RLE pain   Neurological: Positive for tingling. Physical Exam   Constitutional: He is oriented to person, place, and time. He appears well-developed and well-nourished. No distress. Cardiovascular: Normal rate and regular rhythm. Pulmonary/Chest: Effort normal and breath sounds normal.   Musculoskeletal: He exhibits no edema. LLE with generalized pain; warm to the touch with intact distal pulses. No color change or significant swelling. Neurological: He is alert and oriented to person, place, and time. Skin: Skin is warm and dry. He is not diaphoretic. Psychiatric: He has a normal mood and affect. Nursing note and vitals reviewed.       ASSESSMENT and PLAN    ICD-10-CM ICD-9-CM 1. Hospital discharge follow-up Z09 V67.59    2. Neuropathic pain M79.2 729.2 XR SPINE LUMB 2 OR 3 V      gabapentin (NEURONTIN) 300 mg capsule      METABOLIC PANEL, BASIC      LIPID PANEL      MICROALBUMIN, UR, RAND W/ MICROALBUMIN/CREA RATIO      VITAMIN B12 & FOLATE   3. Type 2 diabetes mellitus without complication, without long-term current use of insulin ([facility]) Q90.3 048.99 METABOLIC PANEL, BASIC      HEMOGLOBIN A1C W/O EAG      MICROALBUMIN, UR, RAND W/ MICROALBUMIN/CREA RATIO   4. Essential hypertension P50 017.6 METABOLIC PANEL, BASIC      LIPID PANEL   5. Peripheral vascular disease (HCC) I73.9 443.9    6. Unsteadiness on feet  R26.81 781.2 VITAMIN B12 & FOLATE        Suspect a significant component of his pain is neuropathic - will trial Gabapentin with titration and see him back within a couple of weeks. Some of his pain may also represent a lower spine pathology; he has not had any imaging of his lower back per our records. Will obtain L-spine X-ray. Update labs. He may be trying to develop regional pain syndrome, given his history of PVD, recent iliac artery occlusion with subsequent treatment, neuropathy and possible lumbar spine pathology. His diabetes is well controlled. His blood pressure is elevated but this may represent a response to pain. Will monitor for now. Available hospital record reviewed.

## 2017-11-10 NOTE — PROGRESS NOTES
ROOM # 5    Diana Godinez presents today for   Chief Complaint   Patient presents with   [facility] Follow Up     blood clot       Diana Godinez preferred language for health care discussion is english/other. Is someone accompanying this pt? no    Is the patient using any DME equipment during [address]? Yes, cane    Depression Screening:  PHQ over the last two weeks 11/10/2017 6/22/2017 2/6/2017 7/11/2016 5/12/2015 7/10/2014   Little interest or pleasure in doing things More than half the days Not at all Not at all Not at all Not at all Not at all   Feeling down, depressed or hopeless More than half the days Not at all Not at all Not at all Not at all Not at all   Total Score PHQ 2 4 0 0 0 0 0       Learning Assessment:  Learning Assessment 7/10/2014   PRIMARY LEARNER Patient   HIGHEST LEVEL OF EDUCATION - PRIMARY LEARNER  GRADUATED HIGH SCHOOL OR GED   BARRIERS PRIMARY LEARNER NONE   CO-LEARNER CAREGIVER No   PRIMARY LANGUAGE ENGLISH   LEARNER PREFERENCE PRIMARY DEMONSTRATION   ANSWERED BY self   RELATIONSHIP SELF       Abuse Screening:  Abuse Screening Questionnaire 11/10/2017 5/12/2015   Do you ever feel afraid of your partner? N N   Are you in a relationship with someone who physically or mentally threatens you? N N   Is it safe for you to go home? Y Y       Fall Risk  Fall Risk Assessment, last 12 mths 11/10/2017 6/22/2017 2/6/2017 7/11/2016   Able to walk? Yes Yes Yes Yes   Fall in past 12 months? No No No No       Health Maintenance reviewed and discussed per provider. Yes    Diana Godinez is due for glaucoma, pnue, lipd, mwv, foot,hemo, micro . Please order/place referral if appropriate. Advance Directive:  1. Do you have an advance directive in place? Patient Reply: yes    2. If not, would you like material regarding how to put one in place? Patient Reply: yes    Coordination of Care:  1. Have you been to the ER, urgent care clinic since your last visit? Hospitalized since your last visit? no    2.  Have you seen or consulted any other health care providers outside of the [address] [place] [address] since your last visit? Include any pap smears or colon screening.  no

## 2017-11-10 NOTE — MR AVS SNAPSHOT
Visit Information Date & Time Provider Department Dept. Phone Encounter #  
 11/10/2017  8:00 AM Marlon Prader, [address] [id number] Follow-up Instructions Return in about 2 weeks (around 11/24/2017) for check on med changes, htn, leg pain. Upcoming Health Maintenance Date Due Pneumococcal 65+ Low/Medium Risk (1 of 2 - PCV13) 3/3/2016 MEDICARE YEARLY EXAM 3/3/2016 LIPID PANEL Q1 10/3/2016 HEMOGLOBIN A1C Q6M 1/11/2017 MICROALBUMIN Q1 7/11/2017 DTaP/Tdap/Td series (1 - Tdap) 2/6/2018* EYE EXAM RETINAL OR DILATED Q1 2/6/2018* FOOT EXAM Q1 2/8/2018* GLAUCOMA SCREENING Q2Y 4/28/2020* COLONOSCOPY 1/20/2020 *Topic was postponed. The date shown is not the original due date. Allergies as of 11/10/2017  Review Complete On: 11/10/2017 By: Marlon Prader, MD  
 No Known Allergies Current Immunizations  Reviewed on 12/26/2014 Name Date Influenza Vaccine 9/12/2016 Influenza Vaccine Karole Bailee) 10/3/2015 Influenza Vaccine PF 12/26/2014  2:06 PM  
  
 Not reviewed this visit You Were Diagnosed With   
  
 Codes Comments Hospital discharge follow-up    -  Primary ICD-10-CM: [facility] ICD-9-CM: V67.59 Neuropathic pain     ICD-10-CM: M79.2 ICD-9-CM: 729.2 Type 2 diabetes mellitus without complication, without long-term current use of insulin (HCC)     ICD-10-CM: E11.9 ICD-9-CM: 250.00 Essential hypertension     ICD-10-CM: I10 
ICD-9-CM: 401.9 Peripheral vascular disease ([facility] [address]     ICD-10-CM: I73.9 ICD-9-CM: 443.9 Unsteadiness on feet     ICD-10-CM: R26.81 
ICD-9-CM: 781. 2 Vitals BP Pulse Temp Resp Height(growth percentile) Weight(growth percentile) 161/85 (BP 1 Location: Left arm, BP Patient Position: Sitting) 60 96.5 °F (35.8 °C) (Oral) 18 5' 8\" (1.727 m) 142 lb (64.4 kg) SpO2 BMI Smoking Status 98% 21.59 kg/m2 Former Smoker Vitals History BMI and BSA Data Body Mass Index Body Surface Area  
 21.59 kg/m 2 1.76 m 2 Preferred Pharmacy Pharmacy Name Phone [facility] [facility], [address] [place] [address] [phone] Your Updated Medication List  
  
   
This list is accurate as of: 11/10/17  8:45 AM.  Always use your most recent med list.  
  
  
  
  
 albuterol 90 mcg/actuation inhaler Commonly known as:  PROVENTIL HFA, VENTOLIN HFA, PROAIR HFA Take 1 Puff by inhalation every six (6) hours as needed for Wheezing. aspirin 81 mg chewable tablet 81 mg.  
  
 atorvastatin 40 mg tablet Commonly known as:  LIPITOR 40 mg.  
  
 fluticasone 50 mcg/actuation nasal spray Commonly known as:  Shakeel Cyphers 2 Sprays by Both Nostrils route daily. Indications: ALLERGIC RHINITIS  
  
 folic acid 1 mg tablet Commonly known as:  FOLVITE  
1 mg.  
  
 gabapentin 300 mg capsule Commonly known as:  NEURONTIN Take 1 capsule at night for four nights; then increase to twice a day for four days; then increase to three times per day. losartan 50 mg tablet Commonly known as:  COZAAR  
take 1 tablet by mouth once daily  
  
 metFORMIN 500 mg tablet Commonly known as:  GLUCOPHAGE Take 1 Tab by mouth two (2) times daily (with meals). omeprazole 20 mg capsule Commonly known as:  PRILOSEC  
20 mg.  
  
 * oxyCODONE-acetaminophen 5-325 mg per tablet Commonly known as:  PERCOCET Take 2 Tabs by mouth every four (4) hours as needed for Pain. Max Daily Amount: 12 Tabs. * oxyCODONE-acetaminophen 5-325 mg per tablet Commonly known as:  PERCOCET Take 2 Tabs by mouth every four (4) hours as needed for Pain. Max Daily Amount: 12 Tabs. * oxyCODONE-acetaminophen 5-325 mg per tablet Commonly known as:  PERCOCET Take 2 Tabs by mouth every four (4) hours as needed for Pain. Max Daily Amount: 12 Tabs.  
  
 sildenafil citrate 100 mg tablet Commonly known as:  VIAGRA Take 1 Tab by mouth as needed. Indications: ERECTILE DYSFUNCTION  
  
 therapeutic multivitamin-minerals tablet Commonly known as:  THERAGRAN-M Take 1 Tab by Mouth Once a Day. thiamine 100 mg tablet Commonly known as:  B-1  
100 mg.  
  
 * Notice: This list has 3 medication(s) that are the same as other medications prescribed for you. Read the directions carefully, and ask your doctor or other care provider to review them with you. Prescriptions Sent to Pharmacy Refills  
 gabapentin (NEURONTIN) 300 mg capsule 1 Sig: Take 1 capsule at night for four nights; then increase to twice a day for four days; then increase to three times per day. Class: Normal  
 Pharmacy: [address] [place] [address], [address] [place] [address] #: [phone] Follow-up Instructions Return in about 2 weeks (around 11/24/2017) for check on med changes, htn, leg pain. To-Do List   
 11/10/2017 Lab:  HEMOGLOBIN A1C W/O EAG   
  
 11/10/2017 Lab:  LIPID PANEL   
  
 11/10/2017 Lab:  METABOLIC PANEL, BASIC   
  
 11/10/2017 Lab:  MICROALBUMIN, UR, RAND W/ MICROALBUMIN/CREA RATIO   
  
 11/10/2017 Lab:  VITAMIN B12 & FOLATE   
  
 11/10/2017 Imaging:  XR SPINE LUMB 2 OR 3 V Introducing [facility]! [facility] introduces [employer]iv patient portal. Now you can access parts of your medical record, email your doctor's office, and request medication refills online. 1. In your internet browser, go to [email or web address] 2. Click on the First Time User? Click Here link in the Sign In box. You will see the New Member Sign Up page. 3. Enter your Deliv Access Code exactly as it appears below. You will not need to use this code after youve completed the sign-up process. If you do not sign up before the expiration date, you must request a new code. · Deliv Access Code: [email or web address] Expires: 2/8/2018  8:40 AM 
 4. Enter the last four digits of your Social Security Number (xxxx) and Date of Birth (mm/dd/yyyy) as indicated and click Submit. You will be taken to the next sign-up page. 5. Create a [employer] ID. This will be your [employer] login ID and cannot be changed, so think of one that is secure and easy to remember. 6. Create a [employer] password. You can change your password at any time. 7. Enter your Password Reset Question and Answer. This can be used at a later time if you forget your password. 8. Enter your e-mail address. You will receive e-mail notification when new information is available in [address] 9. Click Sign Up. You can now view and download portions of your medical record. 10. Click the Download Summary menu link to download a portable copy of your medical information. If you have questions, please visit the Frequently Asked Questions section of the [employer] website. Remember, [employer] is NOT to be used for urgent needs. For medical emergencies, dial 911. Now available from your iPhone and Android! Please provide this summary of care documentation to your next provider. Your primary care clinician is listed as [facility]. If you have any questions after today's visit, please call [phone].

## 2017-11-11 LAB
FOLATE SERPL-MCNC: 11.7 NG/ML
VIT B12 SERPL-MCNC: 435 PG/ML (ref 211–946)

## 2017-11-12 LAB
ALBUMIN/CREAT UR: 3 MG/G CREAT (ref 0–30)
BUN SERPL-MCNC: 14 MG/DL (ref 8–27)
BUN/CREAT SERPL: 14 (ref 10–24)
CALCIUM SERPL-MCNC: 10 MG/DL (ref 8.6–10.2)
CHLORIDE SERPL-SCNC: 103 MMOL/L (ref 96–106)
CHOLEST SERPL-MCNC: 275 MG/DL (ref 100–199)
CO2 SERPL-SCNC: 25 MMOL/L (ref 18–29)
CREAT SERPL-MCNC: 1.02 MG/DL (ref 0.76–1.27)
CREAT UR-MCNC: 135.1 MG/DL
GFR SERPLBLD CREATININE-BSD FMLA CKD-EPI: 76 ML/MIN/1.73
GFR SERPLBLD CREATININE-BSD FMLA CKD-EPI: 88 ML/MIN/1.73
GLUCOSE SERPL-MCNC: 97 MG/DL (ref 65–99)
HBA1C MFR BLD: 5.3 % (ref 4.8–5.6)
HDLC SERPL-MCNC: 79 MG/DL
INTERPRETATION, 910389: NORMAL
LDLC SERPL CALC-MCNC: 178 MG/DL (ref 0–99)
MICROALBUMIN UR-MCNC: 4 UG/ML
POTASSIUM SERPL-SCNC: 4.6 MMOL/L (ref 3.5–5.2)
SODIUM SERPL-SCNC: 144 MMOL/L (ref 134–144)
TRIGL SERPL-MCNC: 89 MG/DL (ref 0–149)
VLDLC SERPL CALC-MCNC: 18 MG/DL (ref 5–40)

## 2018-01-09 ENCOUNTER — OFFICE VISIT (OUTPATIENT)
Dept: INTERNAL MEDICINE CLINIC | Age: 68
End: 2018-01-09

## 2018-01-09 VITALS
BODY MASS INDEX: 20 KG/M2 | WEIGHT: 132 LBS | SYSTOLIC BLOOD PRESSURE: 145 MMHG | DIASTOLIC BLOOD PRESSURE: 92 MMHG | OXYGEN SATURATION: 100 % | RESPIRATION RATE: 17 BRPM | HEART RATE: 85 BPM | HEIGHT: 68 IN | TEMPERATURE: 96.1 F

## 2018-01-09 DIAGNOSIS — M79.605 PAIN OF LEFT LOWER EXTREMITY: ICD-10-CM

## 2018-01-09 DIAGNOSIS — Z09 HOSPITAL DISCHARGE FOLLOW-UP: Primary | ICD-10-CM

## 2018-01-09 DIAGNOSIS — I10 ESSENTIAL HYPERTENSION: Chronic | ICD-10-CM

## 2018-01-09 DIAGNOSIS — I73.9 PAD (PERIPHERAL ARTERY DISEASE) (HCC): ICD-10-CM

## 2018-01-09 DIAGNOSIS — E11.9 TYPE 2 DIABETES MELLITUS WITHOUT COMPLICATION, WITHOUT LONG-TERM CURRENT USE OF INSULIN (HCC): Chronic | ICD-10-CM

## 2018-01-09 PROBLEM — E11.40 TYPE 2 DIABETES MELLITUS WITH DIABETIC NEUROPATHY (HCC): Status: ACTIVE | Noted: 2018-01-09

## 2018-01-09 LAB — HBA1C MFR BLD HPLC: 5 %

## 2018-01-09 RX ORDER — METOPROLOL SUCCINATE 25 MG/1
25 TABLET, EXTENDED RELEASE ORAL
COMMUNITY
Start: 2017-12-27 | End: 2018-02-06 | Stop reason: SDUPTHER

## 2018-01-09 RX ORDER — OXYCODONE AND ACETAMINOPHEN 7.5; 325 MG/1; MG/1
2 TABLET ORAL
Qty: 60 TAB | Refills: 0 | Status: SHIPPED | OUTPATIENT
Start: 2018-01-09 | End: 2018-01-24

## 2018-01-09 RX ORDER — LOSARTAN POTASSIUM 100 MG/1
100 TABLET ORAL DAILY
Qty: 30 TAB | Refills: 5 | Status: SHIPPED | OUTPATIENT
Start: 2018-01-09 | End: 2018-03-20 | Stop reason: SDUPTHER

## 2018-01-09 RX ORDER — PREGABALIN 50 MG/1
50 CAPSULE ORAL
COMMUNITY
Start: 2017-12-22 | End: 2018-01-09 | Stop reason: SDUPTHER

## 2018-01-09 RX ORDER — PREGABALIN 50 MG/1
50 CAPSULE ORAL 2 TIMES DAILY
Qty: 60 CAP | Refills: 2 | Status: SHIPPED | OUTPATIENT
Start: 2018-01-09 | End: 2018-01-24 | Stop reason: SDUPTHER

## 2018-01-09 NOTE — ASSESSMENT & PLAN NOTE
Stable, based on history, physical exam and review of pertinent labs, studies and medications; meds reconciled; continue current treatment plan, medication compliance emphasized. Key Antihyperglycemic Medications             metFORMIN (GLUCOPHAGE) 500 mg tablet  (Taking) Take 1 Tab by mouth two (2) times daily (with meals). Other Key Diabetic Medications             pregabalin (LYRICA) 50 mg capsule  (Taking) Take 1 Cap by mouth two (2) times a day. Max Daily Amount: 100 mg.    losartan (COZAAR) 100 mg tablet  (Taking) Take 1 Tab by mouth daily. Indications: hypertension    atorvastatin (LIPITOR) 40 mg tablet  (Taking) 40 mg.    gabapentin (NEURONTIN) 300 mg capsule Take 1 capsule at night for four nights; then increase to twice a day for four days; then increase to three times per day.         Lab Results   Component Value Date/Time    Hemoglobin A1c 5.3 11/10/2017 08:57 AM    Glucose 97 11/10/2017 08:57 AM    Creatinine 1.02 11/10/2017 08:57 AM    Microalb/Creat ratio (ug/mg creat.) 3.0 11/10/2017 08:57 AM    Cholesterol, total 275 11/10/2017 08:57 AM    HDL Cholesterol 79 11/10/2017 08:57 AM    LDL, calculated 178 11/10/2017 08:57 AM    Triglyceride 89 11/10/2017 08:57 AM     Diabetic Foot and Eye Exam HM Status   Topic Date Due    Eye Exam  02/06/2018 (Originally 3/3/1960)   [facility] Diabetic Foot Care  02/08/2018 (Originally 7/11/2017)

## 2018-01-09 NOTE — PROGRESS NOTES
ROOM # 6    Jeremie Newman presents today for   Chief Complaint   Patient presents with   [facility] Follow Up     pt was in hospital for 25 days       Jeremie Newman preferred language for health care discussion is english/other. Is someone accompanying this pt? no    Is the patient using any DME equipment during [address]? Yes walker    Depression Screening:  PHQ over the last two weeks 11/10/2017 6/22/2017 2/6/2017 7/11/2016 5/12/2015 7/10/2014   Little interest or pleasure in doing things More than half the days Not at all Not at all Not at all Not at all Not at all   Feeling down, depressed or hopeless More than half the days Not at all Not at all Not at all Not at all Not at all   Total Score PHQ 2 4 0 0 0 0 0       Learning Assessment:  Learning Assessment 7/10/2014   PRIMARY LEARNER Patient   HIGHEST LEVEL OF EDUCATION - PRIMARY LEARNER  [facility] PRIMARY LEARNER NONE   CO-LEARNER CAREGIVER No   PRIMARY LANGUAGE ENGLISH   LEARNER PREFERENCE PRIMARY DEMONSTRATION   ANSWERED BY self   RELATIONSHIP SELF       Abuse Screening:  Abuse Screening Questionnaire 11/10/2017 5/12/2015   Do you ever feel afraid of your partner? N N   Are you in a relationship with someone who physically or mentally threatens you? N N   Is it safe for you to go home? Y Y       Fall Risk  Fall Risk Assessment, last 12 mths 11/10/2017 6/22/2017 2/6/2017 7/11/2016   Able to walk? Yes Yes Yes Yes   Fall in past 12 months? No No No No       Health Maintenance reviewed and discussed per provider. Yes    Jeremie Newman is due for [address] Please order/place referral if appropriate. Advance Directive:  1. Do you have an advance directive in place? Patient Reply: no    2. If not, would you like material regarding how to put one in place? Patient Reply: no    Coordination of Care:  1. Have you been to the ER, urgent care clinic since your last visit? Hospitalized since your last visit? Yes Hakeem Talbot for DVT    2.  Have you seen or consulted any other health care providers outside of the [address] [place] [address] since your last visit? Include any pap smears or colon screening.  no

## 2018-01-09 NOTE — PROGRESS NOTES
HISTORY OF PRESENT ILLNESS  Emeterio Maria is a 78 y.o. male. Visit Vitals    BP (!) 145/92    Pulse 85    Temp 96.1 °F (35.6 °C) (Oral)    Resp 17    Ht 5' 8\" (1.727 m)    Wt 132 lb (59.9 kg)    SpO2 100%    BMI 20.07 kg/m2       HPI Comments: Was in the hospital with infection on his leg. The wound vac has been problematic--keeps coming out. LLE. He had a fasciotomy done. He had vascular surgery for a fem-pop which then got complicated by a thrombosis  He has known PAD and this was done to save the leg    He is in a lot of pain right now. He has percocet, small amounts      Hospital Follow Up   The history is provided by the patient (see comments). This is a new problem. Review of Systems   Constitutional: Negative. Respiratory: Negative. Cardiovascular: Negative. Musculoskeletal:        Severe left lower extremity pain. Neurological: Positive for sensory change (LLE). Physical Exam   Constitutional: He is oriented to person, place, and time. He appears well-developed and well-nourished. No distress. Cardiovascular: Normal rate and regular rhythm. Pulmonary/Chest: Effort normal and breath sounds normal.   Musculoskeletal: He exhibits no edema. Neurological: He is alert and oriented to person, place, and time. Skin: Skin is warm and dry. He is not diaphoretic. Psychiatric: He has a normal mood and affect. Nursing note and vitals reviewed. ASSESSMENT and PLAN    ICD-10-CM ICD-9-CM    1. Hospital discharge follow-up Z09 V67.59    2. Essential hypertension I10 401.9 losartan (COZAAR) 100 mg tablet   3. Type 2 diabetes mellitus without complication, without long-term current use of insulin (HCC) E11.9 250.00 AMB POC GLUCOSE BLOOD, BY GLUCOSE MONITORING DEVICE      AMB POC HEMOGLOBIN A1C   4. Pain of left lower extremity M79.605 729.5 pregabalin (LYRICA) 50 mg capsule      oxyCODONE-acetaminophen (PERCOCET) 7.5-325 mg per tablet   5.  PAD (peripheral artery disease) ([address] I73.9 443.9 pregabalin (LYRICA) 50 mg capsule      oxyCODONE-acetaminophen (PERCOCET) 7.5-325 mg per tablet     Available hospital record reviewed  Reviewed outpt vascular notes    Pt with a lot of pain from his surgery. He is supposed to be on lyrica 50 mg BID  He takes percocet 5/325 for pain. I have discussed with him and will in this to a higher dose for the month    BP creeping up--will adjust losartan to max 100 mg dose    DM-- check HBA1c.  He has neuropathic pain but it is not clear if it is from diabetes or vascular in origin    F/u one month

## 2018-01-09 NOTE — MR AVS SNAPSHOT
Visit Information Date & Time Provider Department Dept. Phone Encounter #  
 1/9/2018 10:30 AM Harper Tovar MD [facility] [phone] [id number] Follow-up Instructions Return in about 1 month (around 2/9/2018) for HTN, DM. Upcoming Health Maintenance Date Due  
 MEDICARE YEARLY EXAM 3/3/2016 DTaP/Tdap/Td series (1 - Tdap) 2/6/2018* EYE EXAM RETINAL OR DILATED Q1 2/6/2018* FOOT EXAM Q1 2/8/2018* GLAUCOMA SCREENING Q2Y 4/28/2020* HEMOGLOBIN A1C Q6M 5/10/2018 Pneumococcal 65+ Low/Medium Risk (2 of 2 - PCV13) 9/1/2018 MICROALBUMIN Q1 11/10/2018 LIPID PANEL Q1 11/10/2018 COLONOSCOPY 1/18/2020 *Topic was postponed. The date shown is not the original due date. Allergies as of 1/9/2018  Review Complete On: 1/9/2018 By: Harper Tovar MD  
 No Known Allergies Current Immunizations  Reviewed on 1/8/2018 Name Date Influenza Vaccine 9/1/2017 12:00 AM, 9/12/2016, 10/1/2013 12:00 AM  
 Influenza Vaccine (Quad) 10/3/2015 Influenza Vaccine PF 12/26/2014  2:06 PM  
 Pneumococcal Polysaccharide (PPSV-23) 9/1/2017 12:00 AM, 7/7/2013 12:00 AM  
 TB Skin Test (PPD) Intradermal 3/11/2009 12:00 AM  
  
 Not reviewed this visit You Were Diagnosed With   
  
 Codes Comments Hospital discharge follow-up    -  Primary ICD-10-CM: [facility] ICD-9-CM: V67.59 Essential hypertension     ICD-10-CM: I10 
ICD-9-CM: 401.9 Type 2 diabetes mellitus without complication, without long-term current use of insulin (HCC)     ICD-10-CM: E11.9 ICD-9-CM: 250.00 Pain of left lower extremity     ICD-10-CM: M79.605 ICD-9-CM: 729.5 PAD (peripheral artery disease) (HCC)     ICD-10-CM: I73.9 ICD-9-CM: 443. 9 Vitals BP Pulse Temp Resp Height(growth percentile) Weight(growth percentile) (!) 145/92 85 96.1 °F (35.6 °C) (Oral) 17 5' 8\" (1.727 m) 132 lb (59.9 kg) SpO2 BMI Smoking Status 100% 20.07 kg/m2 Former Smoker BMI and BSA Data Body Mass Index Body Surface Area 20.07 kg/m 2 1.7 m 2 Preferred Pharmacy Pharmacy Name Phone [facility], [address] [place] [address] [phone] Your Updated Medication List  
  
   
This list is accurate as of: 1/9/18 11:18 AM.  Always use your most recent med list.  
  
  
  
  
 albuterol 90 mcg/actuation inhaler Commonly known as:  PROVENTIL HFA, VENTOLIN HFA, PROAIR HFA Take 1 Puff by inhalation every six (6) hours as needed for Wheezing. aspirin 81 mg chewable tablet 81 mg.  
  
 atorvastatin 40 mg tablet Commonly known as:  LIPITOR 40 mg.  
  
 fluticasone 50 mcg/actuation nasal spray Commonly known as:  Federico Goody 2 Sprays by Both Nostrils route daily. Indications: ALLERGIC RHINITIS  
  
 folic acid 1 mg tablet Commonly known as:  FOLVITE  
1 mg.  
  
 gabapentin 300 mg capsule Commonly known as:  NEURONTIN Take 1 capsule at night for four nights; then increase to twice a day for four days; then increase to three times per day. losartan 100 mg tablet Commonly known as:  COZAAR Take 1 Tab by mouth daily. Indications: hypertension  
  
 metFORMIN 500 mg tablet Commonly known as:  GLUCOPHAGE Take 1 Tab by mouth two (2) times daily (with meals). metoprolol succinate 25 mg XL tablet Commonly known as:  TOPROL-XL 25 mg.  
  
 omeprazole 20 mg capsule Commonly known as:  PRILOSEC  
20 mg.  
  
 * oxyCODONE-acetaminophen 5-325 mg per tablet Commonly known as:  PERCOCET Take 2 Tabs by mouth every four (4) hours as needed for Pain. Max Daily Amount: 12 Tabs. * oxyCODONE-acetaminophen 5-325 mg per tablet Commonly known as:  PERCOCET Take 2 Tabs by mouth every four (4) hours as needed for Pain. Max Daily Amount: 12 Tabs. * oxyCODONE-acetaminophen 5-325 mg per tablet Commonly known as:  PERCOCET  
 Take 2 Tabs by mouth every four (4) hours as needed for Pain. Max Daily Amount: 12 Tabs. * oxyCODONE-acetaminophen 7.5-325 mg per tablet Commonly known as:  PERCOCET Take 2 Tabs by mouth every four (4) hours as needed for Pain. Max Daily Amount: 12 Tabs. pregabalin 50 mg capsule Commonly known as:  Natalie Settles Take 1 Cap by mouth two (2) times a day. Max Daily Amount: 100 mg.  
  
 sildenafil citrate 100 mg tablet Commonly known as:  VIAGRA Take 1 Tab by mouth as needed. Indications: ERECTILE DYSFUNCTION  
  
 therapeutic multivitamin-minerals tablet Commonly known as:  THERAGRAN-M Take 1 Tab by Mouth Once a Day. thiamine 100 mg tablet Commonly known as:  B-1  
100 mg.  
  
 * Notice: This list has 4 medication(s) that are the same as other medications prescribed for you. Read the directions carefully, and ask your doctor or other care provider to review them with you. Prescriptions Printed Refills  
 pregabalin (LYRICA) 50 mg capsule 2 Sig: Take 1 Cap by mouth two (2) times a day. Max Daily Amount: 100 mg. Class: Print Route: Oral  
 oxyCODONE-acetaminophen (PERCOCET) 7.5-325 mg per tablet 0 Sig: Take 2 Tabs by mouth every four (4) hours as needed for Pain. Max Daily Amount: 12 Tabs. Class: Print Route: Oral  
  
Prescriptions Sent to Pharmacy Refills  
 losartan (COZAAR) 100 mg tablet 5 Sig: Take 1 Tab by mouth daily. Indications: hypertension Class: Normal  
 Pharmacy: [address] [place] [address] #: [phone] Route: Oral  
  
We Performed the Following AMB POC GLUCOSE BLOOD, BY GLUCOSE MONITORING DEVICE [06299 CPT(R)] AMB POC HEMOGLOBIN A1C [44689 CPT(R)] Follow-up Instructions Return in about 1 month (around 2/9/2018) for HTN, DM. Introducing \[facility]\"" & HEALTH SERVICES!    
 [facility] introduces English[employer] patient portal. Now you can access parts of your medical record, email your doctor's office, and request medication refills online. 1. In your internet browser, go to [email or web address] 2. Click on the First Time User? Click Here link in the Sign In box. You will see the New Member Sign Up page. 3. Enter your Whodini Access Code exactly as it appears below. You will not need to use this code after youve completed the sign-up process. If you do not sign up before the expiration date, you must request a new code. · Whodini Access Code: [email or web address] Expires: 2/8/2018  8:40 AM 
 
4. Enter the last four digits of your Social Security Number (xxxx) and Date of Birth (mm/dd/yyyy) as indicated and click Submit. You will be taken to the next sign-up page. 5. Create a Whodini ID. This will be your Whodini login ID and cannot be changed, so think of one that is secure and easy to remember. 6. Create a Whodini password. You can change your password at any time. 7. Enter your Password Reset Question and Answer. This can be used at a later time if you forget your password. 8. Enter your e-mail address. You will receive e-mail notification when new information is available in [address] 9. Click Sign Up. You can now view and download portions of your medical record. 10. Click the Download Summary menu link to download a portable copy of your medical information. If you have questions, please visit the Frequently Asked Questions section of the Whodini website. Remember, Whodini is NOT to be used for urgent needs. For medical emergencies, dial 911. Now available from your iPhone and Android! Please provide this summary of care documentation to your next provider. Your primary care clinician is listed as [facility]. If you have any questions after today's visit, please call [phone].

## 2018-01-23 ENCOUNTER — TELEPHONE (OUTPATIENT)
Dept: INTERNAL MEDICINE CLINIC | Age: 68
End: 2018-01-23

## 2018-01-23 DIAGNOSIS — M79.605 PAIN OF LEFT LOWER EXTREMITY: ICD-10-CM

## 2018-01-23 DIAGNOSIS — I73.9 PAD (PERIPHERAL ARTERY DISEASE) (HCC): ICD-10-CM

## 2018-01-23 DIAGNOSIS — Z76.0 MEDICATION REFILL: ICD-10-CM

## 2018-01-23 RX ORDER — OXYCODONE AND ACETAMINOPHEN 5; 325 MG/1; MG/1
2 TABLET ORAL
Qty: 60 TAB | Refills: 0 | OUTPATIENT
Start: 2018-01-23

## 2018-01-23 NOTE — TELEPHONE ENCOUNTER
Requested Prescriptions     Pending Prescriptions Disp Refills    oxyCODONE-acetaminophen (PERCOCET) 5-325 mg per tablet 60 Tab 0     Sig: Take 2 Tabs by mouth every four (4) hours as needed for Pain. Max Daily Amount: 12 Tabs.

## 2018-01-23 NOTE — TELEPHONE ENCOUNTER
Needs UDS and pain contract. This was written on 1/9/18 (at a higher strength) and was to last an entire month.

## 2018-01-23 NOTE — TELEPHONE ENCOUNTER
printed and placed in PCP medication refill review folder.      Last UDS date: 6/22/2017  Pain contract signed: 1/18/2016  Last office visit: 1/9/2018  Next Appt: 2/6/2018

## 2018-01-24 RX ORDER — PREGABALIN 100 MG/1
100 CAPSULE ORAL 2 TIMES DAILY
Qty: 60 CAP | Refills: 2 | Status: SHIPPED | OUTPATIENT
Start: 2018-01-24 | End: 2018-02-06 | Stop reason: SDUPTHER

## 2018-01-24 RX ORDER — OXYCODONE AND ACETAMINOPHEN 5; 325 MG/1; MG/1
1 TABLET ORAL
Qty: 60 TAB | Refills: 0 | Status: SHIPPED | OUTPATIENT
Start: 2018-01-24 | End: 2018-02-06 | Stop reason: SDUPTHER

## 2018-01-24 NOTE — TELEPHONE ENCOUNTER
Contacted pt at cell number. Two patient Identifiers confirmed. Advised pt per Dr Merly Zabala notes. Pt verbalized understanding.

## 2018-01-24 NOTE — TELEPHONE ENCOUNTER
Contacted pt at [facility] number. Two patient Identifiers confirmed. Advised pt per Dr Barrientos Clarity notes. Pt verbalized understanding and stated he will be in for uds and pain contract.

## 2018-01-24 NOTE — TELEPHONE ENCOUNTER
I am going to double the lyrica. He should not be continuing the percocet so long.  I will give him a few of the lower dose percocet but he must make them last.  He will need to  th RXs

## 2018-01-24 NOTE — TELEPHONE ENCOUNTER
Contacted pt at [facility] number. Two patient Identifiers confirmed. Pt stated she is still having \" burning in his left heel and I can't put on my shoe. \" Pt stated  I just wanted to see if she can change medication or increase pain meds. Pt stated he was taking medication as rx one tab at 8 am and one in the pm.   Dr Levi Berry please advise.

## 2018-02-06 ENCOUNTER — OFFICE VISIT (OUTPATIENT)
Dept: INTERNAL MEDICINE CLINIC | Age: 68
End: 2018-02-06

## 2018-02-06 VITALS
TEMPERATURE: 97.5 F | HEART RATE: 61 BPM | SYSTOLIC BLOOD PRESSURE: 195 MMHG | OXYGEN SATURATION: 98 % | BODY MASS INDEX: 20.76 KG/M2 | RESPIRATION RATE: 15 BRPM | DIASTOLIC BLOOD PRESSURE: 100 MMHG | WEIGHT: 137 LBS | HEIGHT: 68 IN

## 2018-02-06 DIAGNOSIS — I10 ESSENTIAL HYPERTENSION: Primary | Chronic | ICD-10-CM

## 2018-02-06 DIAGNOSIS — M79.605 PAIN OF LEFT LOWER EXTREMITY: ICD-10-CM

## 2018-02-06 DIAGNOSIS — I73.9 PAD (PERIPHERAL ARTERY DISEASE) (HCC): ICD-10-CM

## 2018-02-06 RX ORDER — OXYCODONE AND ACETAMINOPHEN 7.5; 325 MG/1; MG/1
TABLET ORAL
Refills: 0 | COMMUNITY
Start: 2018-01-09 | End: 2018-02-06

## 2018-02-06 RX ORDER — CAPSAICIN 0.07 G/100G
CREAM TOPICAL 3 TIMES DAILY
Qty: 60 G | Refills: 5 | Status: SHIPPED | OUTPATIENT
Start: 2018-02-06 | End: 2018-03-20 | Stop reason: SDUPTHER

## 2018-02-06 RX ORDER — NORTRIPTYLINE HYDROCHLORIDE 50 MG/1
50 CAPSULE ORAL
Qty: 30 CAP | Refills: 2 | Status: SHIPPED | OUTPATIENT
Start: 2018-02-06 | End: 2018-03-20 | Stop reason: SDUPTHER

## 2018-02-06 RX ORDER — METOPROLOL SUCCINATE 50 MG/1
50 TABLET, EXTENDED RELEASE ORAL DAILY
Qty: 30 TAB | Refills: 2 | Status: SHIPPED | OUTPATIENT
Start: 2018-02-06 | End: 2018-03-20 | Stop reason: SDUPTHER

## 2018-02-06 RX ORDER — PREGABALIN 150 MG/1
150 CAPSULE ORAL 2 TIMES DAILY
Qty: 60 CAP | Refills: 1 | Status: SHIPPED | OUTPATIENT
Start: 2018-02-06 | End: 2018-03-20 | Stop reason: SDUPTHER

## 2018-02-06 RX ORDER — OXYCODONE AND ACETAMINOPHEN 5; 325 MG/1; MG/1
1 TABLET ORAL
Qty: 60 TAB | Refills: 0 | Status: SHIPPED | OUTPATIENT
Start: 2018-02-06 | End: 2018-03-20 | Stop reason: SDUPTHER

## 2018-02-06 RX ORDER — OXYCODONE AND ACETAMINOPHEN 5; 325 MG/1; MG/1
TABLET ORAL
COMMUNITY
Start: 2018-01-24 | End: 2018-02-06

## 2018-02-06 NOTE — MR AVS SNAPSHOT
[address] [place] [address] 
 
 
 [address] [facility] [address] [address] 
[phone] Patient: Radha Claros MRN: [id number] Visit Information Date & Time Provider Department Dept. Phone Encounter #  
 2/6/2018  1:45 PM Sol Jarrell, [facility] [phone] [id number] Follow-up Instructions Return in about 1 month (around 3/6/2018) for htn, chronic pain, check on med changes. Upcoming Health Maintenance Date Due  
 MEDICARE YEARLY EXAM 3/3/2016 DTaP/Tdap/Td series (1 - Tdap) 2/6/2018* EYE EXAM RETINAL OR DILATED Q1 2/6/2018* FOOT EXAM Q1 2/8/2018* GLAUCOMA SCREENING Q2Y 4/28/2020* HEMOGLOBIN A1C Q6M 7/9/2018 Pneumococcal 65+ Low/Medium Risk (2 of 2 - PCV13) 9/1/2018 MICROALBUMIN Q1 11/10/2018 LIPID PANEL Q1 11/10/2018 COLONOSCOPY 1/18/2020 *Topic was postponed. The date shown is not the original due date. Allergies as of 2/6/2018  Review Complete On: 2/6/2018 By: Sol Jarrell MD  
 No Known Allergies Current Immunizations  Reviewed on 2/6/2018 Name Date Influenza Vaccine 9/1/2017 12:00 AM, 9/12/2016, 10/1/2013 12:00 AM  
 Influenza Vaccine (Quad) 10/3/2015 Influenza Vaccine PF 12/26/2014  2:06 PM  
 Pneumococcal Polysaccharide (PPSV-23) 9/1/2017 12:00 AM, 7/7/2013 12:00 AM  
 TB Skin Test (PPD) Intradermal 3/11/2009 12:00 AM  
  
 Reviewed by Tino Torres PHARMD on 2/6/2018 at 11:02 AM  
You Were Diagnosed With   
  
 Codes Comments Essential hypertension    -  Primary ICD-10-CM: I10 
ICD-9-CM: 401.9 Pain of left lower extremity     ICD-10-CM: M79.605 ICD-9-CM: 729.5 PAD (peripheral artery disease) (HCC)     ICD-10-CM: I73.9 ICD-9-CM: 443. 9 Vitals BP Pulse Temp Resp Height(growth percentile) Weight(growth percentile) (!) 195/100 61 97.5 °F (36.4 °C) (Oral) 15 5' 8\" (1.727 m) 137 lb (62.1 kg) SpO2 BMI Smoking Status 98% 20.83 kg/m2 Former Smoker BMI and BSA Data Body Mass Index Body Surface Area  
 20.83 kg/m 2 1.73 m 2 Preferred Pharmacy Pharmacy Name Phone [facility], [address] [place] [address] [phone] Your Updated Medication List  
  
   
This list is accurate as of: 2/6/18  2:32 PM.  Always use your most recent med list.  
  
  
  
  
 albuterol 90 mcg/actuation inhaler Commonly known as:  PROVENTIL HFA, VENTOLIN HFA, PROAIR HFA Take 1 Puff by inhalation every six (6) hours as needed for Wheezing. aspirin 81 mg chewable tablet 81 mg.  
  
 atorvastatin 40 mg tablet Commonly known as:  LIPITOR 40 mg.  
  
 capsaicin 0.075 % topical cream  
Apply  to affected area three (3) times daily. fluticasone 50 mcg/actuation nasal spray Commonly known as:  Jasper Oats 2 Sprays by Both Nostrils route daily. Indications: ALLERGIC RHINITIS  
  
 folic acid 1 mg tablet Commonly known as:  FOLVITE  
1 mg.  
  
 losartan 100 mg tablet Commonly known as:  COZAAR Take 1 Tab by mouth daily. Indications: hypertension  
  
 metFORMIN 500 mg tablet Commonly known as:  GLUCOPHAGE Take 1 Tab by mouth two (2) times daily (with meals). metoprolol succinate 50 mg XL tablet Commonly known as:  TOPROL-XL Take 1 Tab by mouth daily. nortriptyline 50 mg capsule Commonly known as:  PAMELOR Take 1 Cap by mouth nightly. Indications: neuropathic pain  
  
 omeprazole 20 mg capsule Commonly known as:  PRILOSEC  
20 mg.  
  
 * oxyCODONE-acetaminophen 7.5-325 mg per tablet Commonly known as:  PERCOCET 7.5  
TAKE 2 TABLETS BY MOUTH EVERY 4 HOURS AS NEEDED FOR PAIN  
  
 * oxyCODONE-acetaminophen 5-325 mg per tablet Commonly known as:  PERCOCET Take 1 Tab by mouth every six (6) hours as needed for Pain. Max Daily Amount: 4 Tabs. Indications: Pain * oxyCODONE-acetaminophen 5-325 mg per tablet Commonly known as:  PERCOCET Take  by Mouth.  
  
 pregabalin 150 mg capsule Commonly known as:  Natalie Settles Take 1 Cap by mouth two (2) times a day. Max Daily Amount: 300 mg.  
  
 sildenafil citrate 100 mg tablet Commonly known as:  VIAGRA Take 1 Tab by mouth as needed. Indications: ERECTILE DYSFUNCTION  
  
 therapeutic multivitamin-minerals tablet Commonly known as:  THERAGRAN-M Take 1 Tab by Mouth Once a Day. thiamine 100 mg tablet Commonly known as:  B-1  
100 mg.  
  
 * Notice: This list has 3 medication(s) that are the same as other medications prescribed for you. Read the directions carefully, and ask your doctor or other care provider to review them with you. Prescriptions Printed Refills  
 pregabalin (LYRICA) 150 mg capsule 1 Sig: Take 1 Cap by mouth two (2) times a day. Max Daily Amount: 300 mg. Class: Print Route: Oral  
  
Prescriptions Sent to Pharmacy Refills  
 capsaicin 0.075 % topical cream 5 Sig: Apply  to affected area three (3) times daily. Class: Normal  
 Pharmacy: [address] [place] [address] Ph #: [phone] Route: Topical  
 nortriptyline (PAMELOR) 50 mg capsule 2 Sig: Take 1 Cap by mouth nightly. Indications: neuropathic pain  
 Class: Normal  
 Pharmacy: [address] [place] [address] Ph #: [phone] Route: Oral  
 metoprolol succinate (TOPROL-XL) 50 mg XL tablet 2 Sig: Take 1 Tab by mouth daily. Class: Normal  
 Pharmacy: [address] [place] [address] Ph #: [phone] Route: Oral  
  
Follow-up Instructions Return in about 1 month (around 3/6/2018) for htn, chronic pain, check on med changes. Introducing [facility] & HEALTH SERVICES!    
 [facility] introduces [employer] patient portal. Now you can access parts of your medical record, email your doctor's office, and request medication refills online. 1. In your internet browser, go to [email or web address] 2. Click on the First Time User? Click Here link in the Sign In box. You will see the New Member Sign Up page. 3. Enter your ZeroDesktop Access Code exactly as it appears below. You will not need to use this code after youve completed the sign-up process. If you do not sign up before the expiration date, you must request a new code. · ZeroDesktop Access Code: [email or web address] Expires: 2/8/2018  8:40 AM 
 
4. Enter the last four digits of your Social Security Number (xxxx) and Date of Birth (mm/dd/yyyy) as indicated and click Submit. You will be taken to the next sign-up page. 5. Create a ZeroDesktop ID. This will be your ZeroDesktop login ID and cannot be changed, so think of one that is secure and easy to remember. 6. Create a ZeroDesktop password. You can change your password at any time. 7. Enter your Password Reset Question and Answer. This can be used at a later time if you forget your password. 8. Enter your e-mail address. You will receive e-mail notification when new information is available in [address] 9. Click Sign Up. You can now view and download portions of your medical record. 10. Click the Download Summary menu link to download a portable copy of your medical information. If you have questions, please visit the Frequently Asked Questions section of the ZeroDesktop website. Remember, ZeroDesktop is NOT to be used for urgent needs. For medical emergencies, dial 911. Now available from your iPhone and Android! Please provide this summary of care documentation to your next provider. Your primary care clinician is listed as [facility]. If you have any questions after today's visit, please call [phone].

## 2018-02-06 NOTE — ACP (ADVANCE CARE PLANNING)
Advance Directive:  1. Do you have an advance directive in place? Patient Reply: yes    2. If not, would you like material regarding how to put one in place?  Patient Reply: no

## 2018-02-06 NOTE — PROGRESS NOTES
HISTORY OF PRESENT ILLNESS  Amairani Morley is a 78 y.o. male. Visit Vitals    BP (!) 195/100    Pulse 61    Temp 97.5 °F (36.4 °C) (Oral)    Resp 15    Ht 5' 8\" (1.727 m)    Wt 137 lb (62.1 kg)    SpO2 98%    BMI 20.83 kg/m2       HPI Comments: He has chronic left foot pain. Known vascular disease. But his other doctors now feel like his pain in neuropathic in nature, partly due to his diabetes. His other foot, however, has NO sxs which goes against simple neuropathy. He barely touches his foot and the sharp pains shoot. Hypertension    The history is provided by the patient. The current episode started more than 1 week ago. The problem has not changed since onset. There are no associated agents to hypertension. Diabetes   The history is provided by the patient. This is a chronic problem. The current episode started more than 1 week ago. The problem occurs daily. The problem has not changed since onset. Exacerbated by: diet. The symptoms are relieved by medications (diet). Review of Systems   Constitutional: Negative. Negative for chills and fever. Neurological: Positive for sensory change and focal weakness. Sharp, intense, burning pain in left foot. Physical Exam    ASSESSMENT and PLAN    ICD-10-CM ICD-9-CM    1. Essential hypertension I10 401.9    2. Pain of left lower extremity M79.605 729.5 pregabalin (LYRICA) 150 mg capsule      capsaicin 0.075 % topical cream      nortriptyline (PAMELOR) 50 mg capsule      oxyCODONE-acetaminophen (PERCOCET) 5-325 mg per tablet   3. PAD (peripheral artery disease) ([facility]) I73.9 443.9 oxyCODONE-acetaminophen (PERCOCET) 5-325 mg per tablet       BP high--but he is in a LOT of pain from his foot  Will inc his toprol to 50 mg    Wants the lower dose of percocet--does not like the 7.5 mg dose. But I have only given him the 5 mg dose and will refill today.  reviewed and appropriate activity noted. No adverse activity noted.  Needs UDS for additional refills    Will inc the lyrica, add capsaicin, add nortriptyline  Does he actually have Chronic Regional Pain Syndrome?  This is much harder to treat    F/u one month for recheck

## 2018-02-06 NOTE — PROGRESS NOTES
ROOM # 5    Patricia Gonsales presents today for   Chief Complaint   Patient presents with    Hypertension    Diabetes    Peripheral Neuropathy     left foot       Patricia Gonsales preferred language for health care discussion is english/other. Is someone accompanying this pt? no    Is the patient using any DME equipment during [address]? Yes rollator    Depression Screening:  PHQ over the last two weeks 11/10/2017 6/22/2017 2/6/2017 7/11/2016 5/12/2015 7/10/2014   Little interest or pleasure in doing things More than half the days Not at all Not at all Not at all Not at all Not at all   Feeling down, depressed or hopeless More than half the days Not at all Not at all Not at all Not at all Not at all   Total Score PHQ 2 4 0 0 0 0 0       Learning Assessment:  Learning Assessment 7/10/2014   PRIMARY LEARNER Patient   HIGHEST LEVEL OF EDUCATION - PRIMARY LEARNER  GRADUATED HIGH SCHOOL OR GED   BARRIERS PRIMARY LEARNER NONE   CO-LEARNER CAREGIVER No   PRIMARY LANGUAGE ENGLISH   LEARNER PREFERENCE PRIMARY DEMONSTRATION   ANSWERED BY self   RELATIONSHIP SELF       Abuse Screening:  Abuse Screening Questionnaire 11/10/2017 5/12/2015   Do you ever feel afraid of your partner? N N   Are you in a relationship with someone who physically or mentally threatens you? N N   Is it safe for you to go home? Y Y       Fall Risk  Fall Risk Assessment, last 12 mths 11/10/2017 6/22/2017 2/6/2017 7/11/2016   Able to walk? Yes Yes Yes Yes   Fall in past 12 months? No No No No       Health Maintenance reviewed and discussed per provider. Yes    Patricia Gonsales is due for [address] Please order/place referral if appropriate. Advance Directive:  1. Do you have an advance directive in place? Patient Reply: yes    2. If not, would you like material regarding how to put one in place? Patient Reply: no    Coordination of Care:  1. Have you been to the ER, urgent care clinic since your last visit? Hospitalized since your last visit?  Yes ER [facility] for foot pain and BP    2. Have you seen or consulted any other health care providers outside of the [address] [place] [address] since your last visit? Include any pap smears or colon screening.  no

## 2018-03-20 ENCOUNTER — OFFICE VISIT (OUTPATIENT)
Dept: INTERNAL MEDICINE CLINIC | Age: 68
End: 2018-03-20

## 2018-03-20 VITALS
RESPIRATION RATE: 16 BRPM | OXYGEN SATURATION: 98 % | HEART RATE: 63 BPM | BODY MASS INDEX: 21.57 KG/M2 | WEIGHT: 142.3 LBS | TEMPERATURE: 97.7 F | HEIGHT: 68 IN | SYSTOLIC BLOOD PRESSURE: 153 MMHG | DIASTOLIC BLOOD PRESSURE: 89 MMHG

## 2018-03-20 DIAGNOSIS — I10 ESSENTIAL HYPERTENSION: Chronic | ICD-10-CM

## 2018-03-20 DIAGNOSIS — E78.00 HYPERCHOLESTEROLEMIA: Chronic | ICD-10-CM

## 2018-03-20 DIAGNOSIS — Z79.891 LONG TERM (CURRENT) USE OF OPIATE ANALGESIC: ICD-10-CM

## 2018-03-20 DIAGNOSIS — E11.40 TYPE 2 DIABETES MELLITUS WITH DIABETIC NEUROPATHY, WITHOUT LONG-TERM CURRENT USE OF INSULIN (HCC): Primary | ICD-10-CM

## 2018-03-20 DIAGNOSIS — I73.9 PAD (PERIPHERAL ARTERY DISEASE) (HCC): ICD-10-CM

## 2018-03-20 DIAGNOSIS — M79.605 PAIN OF LEFT LOWER EXTREMITY: ICD-10-CM

## 2018-03-20 RX ORDER — CAPSAICIN 0.07 G/100G
CREAM TOPICAL 3 TIMES DAILY
Qty: 60 G | Refills: 5 | Status: SHIPPED | OUTPATIENT
Start: 2018-03-20 | End: 2018-05-09

## 2018-03-20 RX ORDER — OXYCODONE AND ACETAMINOPHEN 5; 325 MG/1; MG/1
1 TABLET ORAL
Qty: 60 TAB | Refills: 0 | Status: SHIPPED | OUTPATIENT
Start: 2018-03-20 | End: 2018-05-09

## 2018-03-20 RX ORDER — METFORMIN HYDROCHLORIDE 500 MG/1
500 TABLET ORAL 2 TIMES DAILY WITH MEALS
Qty: 60 TAB | Refills: 5 | Status: SHIPPED | OUTPATIENT
Start: 2018-03-20 | End: 2018-06-22 | Stop reason: SDUPTHER

## 2018-03-20 RX ORDER — PREGABALIN 150 MG/1
150 CAPSULE ORAL 2 TIMES DAILY
Qty: 60 CAP | Refills: 5 | Status: SHIPPED | OUTPATIENT
Start: 2018-03-20 | End: 2018-06-20 | Stop reason: SDUPTHER

## 2018-03-20 RX ORDER — ATORVASTATIN CALCIUM 40 MG/1
40 TABLET, FILM COATED ORAL DAILY
Qty: 30 TAB | Refills: 5 | Status: SHIPPED | OUTPATIENT
Start: 2018-03-20 | End: 2018-05-09

## 2018-03-20 RX ORDER — LOSARTAN POTASSIUM 100 MG/1
100 TABLET ORAL DAILY
Qty: 30 TAB | Refills: 5 | Status: SHIPPED | OUTPATIENT
Start: 2018-03-20 | End: 2018-06-07 | Stop reason: ALTCHOICE

## 2018-03-20 RX ORDER — METOPROLOL SUCCINATE 50 MG/1
50 TABLET, EXTENDED RELEASE ORAL DAILY
Qty: 30 TAB | Refills: 5 | Status: SHIPPED | OUTPATIENT
Start: 2018-03-20 | End: 2018-07-26 | Stop reason: SDUPTHER

## 2018-03-20 RX ORDER — NORTRIPTYLINE HYDROCHLORIDE 50 MG/1
50 CAPSULE ORAL
Qty: 30 CAP | Refills: 5 | Status: SHIPPED | OUTPATIENT
Start: 2018-03-20 | End: 2018-05-09

## 2018-03-20 NOTE — MR AVS SNAPSHOT
[address] [place] [address] 
 
 
 [address] [facility] [address] [address] 
[phone] Patient: Ary Ramirez MRN: [id number] Visit Information Date & Time Provider Department Dept. Phone Encounter #  
 3/20/2018  3:30 PM Sophia Macias, [place] [id number] Follow-up Instructions Return in about 3 months (around 6/20/2018) for Medicare Wellness Visit, HTN, DM, cholesterol. Upcoming Health Maintenance Date Due  
 EYE EXAM RETINAL OR DILATED Q1 3/3/1960 MEDICARE YEARLY EXAM 3/14/2018 DTaP/Tdap/Td series (1 - Tdap) 3/20/2019* GLAUCOMA SCREENING Q2Y 4/28/2020* HEMOGLOBIN A1C Q6M 7/9/2018 Pneumococcal 65+ Low/Medium Risk (2 of 2 - PCV13) 9/1/2018 MICROALBUMIN Q1 11/10/2018 LIPID PANEL Q1 11/10/2018 COLONOSCOPY 1/18/2020 *Topic was postponed. The date shown is not the original due date. Allergies as of 3/20/2018  Review Complete On: 3/20/2018 By: Sophia Macias MD  
 No Known Allergies Current Immunizations  Reviewed on 2/6/2018 Name Date Influenza Vaccine 9/1/2017 12:00 AM, 9/12/2016, 10/1/2013 12:00 AM  
 Influenza Vaccine (Quad) 10/3/2015 Influenza Vaccine PF 12/26/2014  2:06 PM  
 Pneumococcal Polysaccharide (PPSV-23) 9/1/2017 12:00 AM, 7/7/2013 12:00 AM  
 TB Skin Test (PPD) Intradermal 3/11/2009 12:00 AM  
  
 Not reviewed this visit You Were Diagnosed With   
  
 Codes Comments Type 2 diabetes mellitus with diabetic neuropathy, without long-term current use of insulin (HCC)    -  Primary ICD-10-CM: E11.40 ICD-9-CM: 250.60, 357.2 Hypercholesterolemia     ICD-10-CM: E78.00 ICD-9-CM: 272.0 Essential hypertension     ICD-10-CM: I10 
ICD-9-CM: 401.9 Pain of left lower extremity     ICD-10-CM: M79.605 ICD-9-CM: 729.5 PAD (peripheral artery disease) (HCC)     ICD-10-CM: I73.9 ICD-9-CM: 443.9 Long term (current) use of opiate analgesic     ICD-10-CM: Y71.838 ICD-9-CM: V58.69 Vitals BP Pulse Temp Resp Height(growth percentile) Weight(growth percentile) 153/89 63 97.7 °F (36.5 °C) (Oral) 16 5' 8\" (1.727 m) 142 lb 4.8 oz (64.5 kg) SpO2 BMI Smoking Status 98% 21.64 kg/m2 Former Smoker Vitals History BMI and BSA Data Body Mass Index Body Surface Area  
 21.64 kg/m 2 1.76 m 2 Preferred Pharmacy Pharmacy Name Phone [facility], [address] [place] [address] [phone] Your Updated Medication List  
  
   
This list is accurate as of 3/20/18  3:58 PM.  Always use your most recent med list.  
  
  
  
  
 albuterol 90 mcg/actuation inhaler Commonly known as:  PROVENTIL HFA, VENTOLIN HFA, PROAIR HFA Take 1 Puff by inhalation every six (6) hours as needed for Wheezing. aspirin 81 mg chewable tablet 81 mg.  
  
 atorvastatin 40 mg tablet Commonly known as:  LIPITOR Take 1 Tab by mouth daily. capsaicin 0.075 % topical cream  
Apply  to affected area three (3) times daily. fluticasone 50 mcg/actuation nasal spray Commonly known as:  Severa Shanta 2 Sprays by Both Nostrils route daily. Indications: ALLERGIC RHINITIS  
  
 folic acid 1 mg tablet Commonly known as:  FOLVITE  
1 mg.  
  
 losartan 100 mg tablet Commonly known as:  COZAAR Take 1 Tab by mouth daily. Indications: hypertension  
  
 metFORMIN 500 mg tablet Commonly known as:  GLUCOPHAGE Take 1 Tab by mouth two (2) times daily (with meals). Indications: type 2 diabetes mellitus  
  
 metoprolol succinate 50 mg XL tablet Commonly known as:  TOPROL-XL Take 1 Tab by mouth daily. Indications: hypertension  
  
 nortriptyline 50 mg capsule Commonly known as:  PAMELOR Take 1 Cap by mouth nightly. Indications: neuropathic pain  
  
 omeprazole 20 mg capsule Commonly known as:  PRILOSEC  
20 mg. oxyCODONE-acetaminophen 5-325 mg per tablet Commonly known as:  PERCOCET Take 1 Tab by mouth every six (6) hours as needed for Pain. Max Daily Amount: 4 Tabs. Indications: Pain  
  
 pregabalin 150 mg capsule Commonly known as:  Marionray Deyning Take 1 Cap by mouth two (2) times a day. Max Daily Amount: 300 mg. Indications: Diabetic Peripheral Neuropathy  
  
 sildenafil citrate 100 mg tablet Commonly known as:  VIAGRA Take 1 Tab by mouth as needed. Indications: ERECTILE DYSFUNCTION  
  
 therapeutic multivitamin-minerals tablet Commonly known as:  THERAGRAN-M Take 1 Tab by Mouth Once a Day. thiamine 100 mg tablet Commonly known as:  B-1  
100 mg. Prescriptions Printed Refills  
 pregabalin (LYRICA) 150 mg capsule 5 Sig: Take 1 Cap by mouth two (2) times a day. Max Daily Amount: 300 mg. Indications: Diabetic Peripheral Neuropathy Class: Print Route: Oral  
 oxyCODONE-acetaminophen (PERCOCET) 5-325 mg per tablet 0 Sig: Take 1 Tab by mouth every six (6) hours as needed for Pain. Max Daily Amount: 4 Tabs. Indications: Pain Class: Print Route: Oral  
  
Prescriptions Sent to Pharmacy Refills  
 atorvastatin (LIPITOR) 40 mg tablet 5 Sig: Take 1 Tab by mouth daily. Class: Normal  
 Pharmacy: [address] [place] [address] Ph #: [phone] Route: Oral  
 metFORMIN (GLUCOPHAGE) 500 mg tablet 5 Sig: Take 1 Tab by mouth two (2) times daily (with meals). Indications: type 2 diabetes mellitus Class: Normal  
 Pharmacy: [address] [place] [address] Ph #: [phone] Route: Oral  
 capsaicin 0.075 % topical cream 5 Sig: Apply  to affected area three (3) times daily. Class: Normal  
 Pharmacy: [address] [place] [address] Ph #: [phone]  Route: Topical  
 nortriptyline (PAMELOR) 50 mg capsule 5 Sig: Take 1 Cap by mouth nightly. Indications: neuropathic pain  
 Class: Normal  
 Pharmacy: [address] [place] [address] Ph #: [phone] Route: Oral  
 metoprolol succinate (TOPROL-XL) 50 mg XL tablet 5 Sig: Take 1 Tab by mouth daily. Indications: hypertension Class: Normal  
 Pharmacy: [address] [place] [address] Ph #: [phone] Route: Oral  
 losartan (COZAAR) 100 mg tablet 5 Sig: Take 1 Tab by mouth daily. Indications: hypertension Class: Normal  
 Pharmacy: [address] [place] [address] Ph #: [phone] Route: Oral  
  
Follow-up Instructions Return in about 3 months (around 6/20/2018) for Medicare Wellness Visit, HTN, DM, cholesterol. To-Do List   
 03/20/2018 Lab:  Dam[facility] ()   
  
  
Introducing [facility]! [facility] introduces [employer] patient portal. Now you can access parts of your medical record, email your doctor's office, and request medication refills online. 1. In your internet browser, go to [email or web address] 2. Click on the First Time User? Click Here link in the Sign In box. You will see the New Member Sign Up page. 3. Enter your [employer] Access Code exactly as it appears below. You will not need to use this code after youve completed the sign-up process. If you do not sign up before the expiration date, you must request a new code. · [employer] Access Code: [email or web address] Expires: 6/18/2018  3:57 PM 
 
4. Enter the last four digits of your Social Security Number (xxxx) and Date of Birth (mm/dd/yyyy) as indicated and click Submit. You will be taken to the next sign-up page. 5. Create a [employer] ID. This will be your [employer] login ID and cannot be changed, so think of one that is secure and easy to remember. 6. Create a wooju password. You can change your password at any time. 7. Enter your Password Reset Question and Answer. This can be used at a later time if you forget your password. 8. Enter your e-mail address. You will receive e-mail notification when new information is available in [address] 9. Click Sign Up. You can now view and download portions of your medical record. 10. Click the Download Summary menu link to download a portable copy of your medical information. If you have questions, please visit the Frequently Asked Questions section of the wooju website. Remember, wooju is NOT to be used for urgent needs. For medical emergencies, dial 911. Now available from your iPhone and Android! Please provide this summary of care documentation to your next provider. Your primary care clinician is listed as [facility]. If you have any questions after today's visit, please call [phone].

## 2018-03-20 NOTE — PROGRESS NOTES
HISTORY OF PRESENT ILLNESS  Daxa English is a 80 y.o. male. Visit Vitals    /89    Pulse 63    Temp 97.7 °F (36.5 °C) (Oral)    Resp 16    Ht 5' 8\" (1.727 m)    Wt 142 lb 4.8 oz (64.5 kg)    SpO2 98%    BMI 21.64 kg/m2       HPI Comments: Mr Gertrude Espinal still  has fasciotomy of LLE form complications of prior vascular diseas    Hypertension    The history is provided by the patient. This is a chronic problem. The current episode started more than 1 week ago. The problem has not changed since onset. Diabetes   The history is provided by the patient. This is a chronic problem. The current episode started more than 1 week ago. The problem occurs daily. The problem has not changed since onset. Exacerbated by: diet. The symptoms are relieved by medications (diet). Review of Systems   Constitutional: Negative for chills and fever. Respiratory: Negative. Cardiovascular: Negative. Musculoskeletal: Positive for joint pain. Severe lower extremity pain and burning   Neurological: Positive for sensory change. Physical Exam   Constitutional: He is oriented to person, place, and time. He appears well-developed and well-nourished. No distress. Cardiovascular: Normal rate and regular rhythm. Pulmonary/Chest: Effort normal and breath sounds normal.   Musculoskeletal: He exhibits no edema. Left foot is cold, Right foot is cool but OK. Poor DP pulses bilat. LLE is bandaged   Neurological: He is alert and oriented to person, place, and time. Skin: Skin is warm and dry. He is not diaphoretic. Psychiatric: He has a normal mood and affect. Nursing note and vitals reviewed. ASSESSMENT and PLAN    ICD-10-CM ICD-9-CM    1. Type 2 diabetes mellitus with diabetic neuropathy, without long-term current use of insulin (HCC) E11.40 250.60 losartan (COZAAR) 100 mg tablet     357.2    2. Hypercholesterolemia E78.00 272.0 atorvastatin (LIPITOR) 40 mg tablet   3.  Essential hypertension I10 401.9 metoprolol succinate (TOPROL-XL) 50 mg XL tablet      losartan (COZAAR) 100 mg tablet   4. Pain of left lower extremity M79.605 729.5 pregabalin (LYRICA) 150 mg capsule      capsaicin 0.075 % topical cream      nortriptyline (PAMELOR) 50 mg capsule      oxyCODONE-acetaminophen (PERCOCET) 5-325 mg per tablet   5. PAD (peripheral artery disease) ([facility]) I73.9 443.9 oxyCODONE-acetaminophen (PERCOCET) 5-325 mg per tablet   6. Long term (current) use of opiate analgesic Z79.891 V58.69 oxyCODONE-acetaminophen (PERCOCET) 5-325 mg per tablet      TOXASSURE SELECT 13 (MW)      losartan (COZAAR) 100 mg tablet       BP borderline today    He has severe peripheral neuropathy AND vascular disease so has chronic pain. Is maintained on Lyrica and percocet.  reviewed and appropriate activity noted. No adverse activity noted. reviewed recent lab.  None needed today    F/u 3 months for recheck

## 2018-03-20 NOTE — PROGRESS NOTES
Identified pt with two pt identifiers(name and ). Reviewed record in preparation for visit and have obtained necessary documentation. Chief Complaint   Patient presents with    Hypertension    Pain (Chronic)    Medication Evaluation        Health Maintenance Due   Topic    EYE EXAM RETINAL OR DILATED Q1     DTaP/Tdap/Td series (1 - Tdap)    FOOT EXAM Q1     MEDICARE YEARLY EXAM        Coordination of Care Questionnaire:  :   1) Have you been to an emergency room, urgent care clinic since your last visit? no   Hospitalized since your last visit? no             2. Have seen or consulted any other health care provider since your last visit? YES  If yes, where when, and reason for visit? Dr. Ajit Reaves (Neuro)     Patient is accompanied by self I have received verbal consent from Ary Ramirez to discuss any/all medical information while they are present in the room.

## 2018-03-28 ENCOUNTER — HOSPITAL ENCOUNTER (EMERGENCY)
Age: 68
Discharge: OTHER INSTITUTION | End: 2018-03-28
Attending: EMERGENCY MEDICINE | Admitting: EMERGENCY MEDICINE
Payer: MEDICARE

## 2018-03-28 VITALS
TEMPERATURE: 98.1 F | OXYGEN SATURATION: 96 % | HEIGHT: 68 IN | RESPIRATION RATE: 16 BRPM | SYSTOLIC BLOOD PRESSURE: 157 MMHG | BODY MASS INDEX: 21.98 KG/M2 | WEIGHT: 145 LBS | HEART RATE: 86 BPM | DIASTOLIC BLOOD PRESSURE: 75 MMHG

## 2018-03-28 DIAGNOSIS — I99.8 ISCHEMIC LEG: Primary | ICD-10-CM

## 2018-03-28 LAB
ALBUMIN SERPL-MCNC: 3.8 G/DL (ref 3.4–5)
ALBUMIN/GLOB SERPL: 1.1 {RATIO} (ref 0.8–1.7)
ALP SERPL-CCNC: 94 U/L (ref 45–117)
ALT SERPL-CCNC: 20 U/L (ref 16–61)
ANION GAP SERPL CALC-SCNC: 10 MMOL/L (ref 3–18)
APTT PPP: 25.8 SEC (ref 23–36.4)
AST SERPL-CCNC: 15 U/L (ref 15–37)
BASOPHILS # BLD: 0 K/UL (ref 0–0.06)
BASOPHILS NFR BLD: 0 % (ref 0–2)
BILIRUB SERPL-MCNC: 0.6 MG/DL (ref 0.2–1)
BUN SERPL-MCNC: 21 MG/DL (ref 7–18)
BUN/CREAT SERPL: 15 (ref 12–20)
CALCIUM SERPL-MCNC: 9.5 MG/DL (ref 8.5–10.1)
CHLORIDE SERPL-SCNC: 105 MMOL/L (ref 100–108)
CO2 SERPL-SCNC: 24 MMOL/L (ref 21–32)
CREAT SERPL-MCNC: 1.39 MG/DL (ref 0.6–1.3)
DIFFERENTIAL METHOD BLD: ABNORMAL
EOSINOPHIL # BLD: 0 K/UL (ref 0–0.4)
EOSINOPHIL NFR BLD: 0 % (ref 0–5)
ERYTHROCYTE [DISTWIDTH] IN BLOOD BY AUTOMATED COUNT: 14 % (ref 11.6–14.5)
GLOBULIN SER CALC-MCNC: 3.6 G/DL (ref 2–4)
GLUCOSE SERPL-MCNC: 232 MG/DL (ref 74–99)
HCT VFR BLD AUTO: 36.5 % (ref 36–48)
HGB BLD-MCNC: 12.2 G/DL (ref 13–16)
INR PPP: 1.2 (ref 0.8–1.2)
LYMPHOCYTES # BLD: 0.7 K/UL (ref 0.9–3.6)
LYMPHOCYTES NFR BLD: 5 % (ref 21–52)
MCH RBC QN AUTO: 30.5 PG (ref 24–34)
MCHC RBC AUTO-ENTMCNC: 33.4 G/DL (ref 31–37)
MCV RBC AUTO: 91.3 FL (ref 74–97)
MONOCYTES # BLD: 0.5 K/UL (ref 0.05–1.2)
MONOCYTES NFR BLD: 4 % (ref 3–10)
NEUTS SEG # BLD: 11.3 K/UL (ref 1.8–8)
NEUTS SEG NFR BLD: 91 % (ref 40–73)
PLATELET # BLD AUTO: 203 K/UL (ref 135–420)
PMV BLD AUTO: 9.9 FL (ref 9.2–11.8)
POTASSIUM SERPL-SCNC: 3.9 MMOL/L (ref 3.5–5.5)
PROT SERPL-MCNC: 7.4 G/DL (ref 6.4–8.2)
PROTHROMBIN TIME: 14.2 SEC (ref 11.5–15.2)
RBC # BLD AUTO: 4 M/UL (ref 4.7–5.5)
SODIUM SERPL-SCNC: 139 MMOL/L (ref 136–145)
WBC # BLD AUTO: 12.5 K/UL (ref 4.6–13.2)

## 2018-03-28 PROCEDURE — 85025 COMPLETE CBC W/AUTO DIFF WBC: CPT | Performed by: EMERGENCY MEDICINE

## 2018-03-28 PROCEDURE — 80053 COMPREHEN METABOLIC PANEL: CPT | Performed by: EMERGENCY MEDICINE

## 2018-03-28 PROCEDURE — 74011250636 HC RX REV CODE- 250/636: Performed by: EMERGENCY MEDICINE

## 2018-03-28 PROCEDURE — 93005 ELECTROCARDIOGRAM TRACING: CPT

## 2018-03-28 PROCEDURE — 85610 PROTHROMBIN TIME: CPT | Performed by: EMERGENCY MEDICINE

## 2018-03-28 PROCEDURE — 96365 THER/PROPH/DIAG IV INF INIT: CPT

## 2018-03-28 PROCEDURE — 85730 THROMBOPLASTIN TIME PARTIAL: CPT | Performed by: EMERGENCY MEDICINE

## 2018-03-28 PROCEDURE — 99285 EMERGENCY DEPT VISIT HI MDM: CPT

## 2018-03-28 PROCEDURE — 96375 TX/PRO/DX INJ NEW DRUG ADDON: CPT

## 2018-03-28 RX ORDER — HEPARIN SODIUM 10000 [USP'U]/100ML
18-36 INJECTION, SOLUTION INTRAVENOUS
Status: DISCONTINUED | OUTPATIENT
Start: 2018-03-28 | End: 2018-03-29 | Stop reason: HOSPADM

## 2018-03-28 RX ORDER — MORPHINE SULFATE 4 MG/ML
4 INJECTION, SOLUTION INTRAMUSCULAR; INTRAVENOUS
Status: COMPLETED | OUTPATIENT
Start: 2018-03-28 | End: 2018-03-28

## 2018-03-28 RX ORDER — ONDANSETRON 2 MG/ML
4 INJECTION INTRAMUSCULAR; INTRAVENOUS
Status: COMPLETED | OUTPATIENT
Start: 2018-03-28 | End: 2018-03-28

## 2018-03-28 RX ORDER — HEPARIN SODIUM 1000 [USP'U]/ML
80 INJECTION, SOLUTION INTRAVENOUS; SUBCUTANEOUS ONCE
Status: COMPLETED | OUTPATIENT
Start: 2018-03-28 | End: 2018-03-28

## 2018-03-28 RX ORDER — SODIUM CHLORIDE 9 MG/ML
100 INJECTION, SOLUTION INTRAVENOUS CONTINUOUS
Status: DISCONTINUED | OUTPATIENT
Start: 2018-03-28 | End: 2018-03-29 | Stop reason: HOSPADM

## 2018-03-28 RX ADMIN — HEPARIN SODIUM 5260 UNITS: 1000 INJECTION, SOLUTION INTRAVENOUS; SUBCUTANEOUS at 21:22

## 2018-03-28 RX ADMIN — SODIUM CHLORIDE 100 ML/HR: 900 INJECTION, SOLUTION INTRAVENOUS at 21:10

## 2018-03-28 RX ADMIN — ONDANSETRON 4 MG: 2 INJECTION INTRAMUSCULAR; INTRAVENOUS at 21:03

## 2018-03-28 RX ADMIN — HEPARIN SODIUM AND DEXTROSE 18 UNITS/KG/HR: 10000; 5 INJECTION INTRAVENOUS at 21:10

## 2018-03-28 RX ADMIN — MORPHINE SULFATE 4 MG: 4 INJECTION, SOLUTION INTRAMUSCULAR; INTRAVENOUS at 21:03

## 2018-03-29 NOTE — ED NOTES
Pt presents to ED BIBA for LLE pain 2 hours ago. H/o DVT multiple surgeries to remove clot, states pain feels the same. Pedal pulses presents and palpable.

## 2018-03-29 NOTE — ED NOTES
TRANSFER - OUT REPORT:    Verbal report given to Wilian Painter RN(name) on Amy Garrison  being transferred to [facility] (unit) for routine progression of care       Report consisted of patients Situation, Background, Assessment and   Recommendations(SBAR). Information from the following report(s) SBAR, ED Summary, Procedure Summary, Intake/Output, MAR, Recent Results, Med Rec Status and Cardiac Rhythm sinus was reviewed with the receiving nurse. Lines:   Peripheral IV 03/28/18 Left Forearm (Active)   Dressing Type Transparent 3/28/2018  9:14 PM   Hub Color/Line Status Flushed 3/28/2018  9:14 PM        Opportunity for questions and clarification was provided.       Patient transported with:   [address] [facility]

## 2018-03-29 NOTE — ED TRIAGE NOTES
Alert male c/o lt leg pain x2 hrs pta, hx dvt reports clot removal done at [facility] 2 months ago. 5/10 aching pain to lt leg constant.

## 2018-03-29 NOTE — ED NOTES
Per Dr. Olson Ours pt heparin drip started at lowest initial infusion rate, see MAR, prior to aPTT result coming back.

## 2018-03-29 NOTE — ED NOTES
[address] [facility] here to transfer pt to [facility] in stable condition. EMTALA forms signed. Heparin dtt continued on transfer.

## 2018-03-30 LAB
ATRIAL RATE: 100 BPM
CALCULATED P AXIS, ECG09: 138 DEGREES
CALCULATED R AXIS, ECG10: 86 DEGREES
CALCULATED T AXIS, ECG11: 35 DEGREES
DIAGNOSIS, 93000: NORMAL
P-R INTERVAL, ECG05: 132 MS
Q-T INTERVAL, ECG07: 376 MS
QRS DURATION, ECG06: 132 MS
QTC CALCULATION (BEZET), ECG08: 485 MS
VENTRICULAR RATE, ECG03: 100 BPM

## 2018-05-03 ENCOUNTER — PATIENT OUTREACH (OUTPATIENT)
Dept: INTERNAL MEDICINE CLINIC | Age: 68
End: 2018-05-03

## 2018-05-09 ENCOUNTER — OFFICE VISIT (OUTPATIENT)
Dept: INTERNAL MEDICINE CLINIC | Age: 68
End: 2018-05-09

## 2018-05-09 VITALS
SYSTOLIC BLOOD PRESSURE: 152 MMHG | HEART RATE: 61 BPM | BODY MASS INDEX: 20.55 KG/M2 | WEIGHT: 135.6 LBS | TEMPERATURE: 98.7 F | RESPIRATION RATE: 20 BRPM | HEIGHT: 68 IN | DIASTOLIC BLOOD PRESSURE: 66 MMHG | OXYGEN SATURATION: 95 %

## 2018-05-09 DIAGNOSIS — R10.32 LEFT INGUINAL PAIN: ICD-10-CM

## 2018-05-09 DIAGNOSIS — R05.9 COUGH: ICD-10-CM

## 2018-05-09 DIAGNOSIS — Z09 HOSPITAL DISCHARGE FOLLOW-UP: Primary | ICD-10-CM

## 2018-05-09 DIAGNOSIS — R10.84 GENERALIZED ABDOMINAL PAIN: ICD-10-CM

## 2018-05-09 PROBLEM — E11.21 TYPE 2 DIABETES WITH NEPHROPATHY (HCC): Status: ACTIVE | Noted: 2018-05-09

## 2018-05-09 RX ORDER — CODEINE PHOSPHATE AND GUAIFENESIN 10; 100 MG/5ML; MG/5ML
5 SOLUTION ORAL
Qty: 180 ML | Refills: 0 | Status: SHIPPED | OUTPATIENT
Start: 2018-05-09 | End: 2018-05-31

## 2018-05-09 RX ORDER — OXYCODONE HYDROCHLORIDE 10 MG/1
10 TABLET ORAL
Qty: 60 TAB | Refills: 0 | Status: SHIPPED | OUTPATIENT
Start: 2018-05-09 | End: 2018-06-20 | Stop reason: SDUPTHER

## 2018-05-09 RX ORDER — OXYCODONE HYDROCHLORIDE 10 MG/1
TABLET ORAL
COMMUNITY
End: 2018-05-09 | Stop reason: SDUPTHER

## 2018-05-09 RX ORDER — TROSPIUM CHLORIDE 20 MG/1
20 TABLET, FILM COATED ORAL
COMMUNITY
End: 2018-10-18

## 2018-05-09 NOTE — MR AVS SNAPSHOT
[address] [place] [address] 
 
 
 [address] [address] 
[phone] Patient: Lela Primrose MRN: [id number] Hinduism:[id number][id number] Visit Information Date & Time Provider Department Dept. Phone Encounter #  
 5/9/2018  4:30 PM [address] [phone] [id number] Follow-up Instructions Return if symptoms worsen or fail to improve, for or as appointed. Your Appointments 6/20/2018  2:00 PM  
Office Visit with MD [address] [facility]) Appt Note: 3 mo f/u htn, dm, cholesterol [address]  
  
   
 [address] [facility] Upcoming Health Maintenance Date Due  
 EYE EXAM RETINAL OR DILATED Q1 3/3/1960 MEDICARE YEARLY EXAM 3/14/2018 DTaP/Tdap/Td series (1 - Tdap) 3/20/2019* GLAUCOMA SCREENING Q2Y 4/28/2020* HEMOGLOBIN A1C Q6M 7/9/2018 Influenza Age 5 to Adult 8/1/2018 Pneumococcal 65+ Low/Medium Risk (2 of 2 - PCV13) 9/1/2018 MICROALBUMIN Q1 11/10/2018 LIPID PANEL Q1 11/10/2018 COLONOSCOPY 1/18/2020 *Topic was postponed. The date shown is not the original due date. Allergies as of 5/9/2018  Review Complete On: 5/9/2018 By: Sherley Alcazar LPN No Known Allergies Current Immunizations  Reviewed on 2/6/2018 Name Date Influenza Vaccine 9/1/2017 12:00 AM, 9/12/2016, 10/1/2013 12:00 AM  
 Influenza Vaccine (Quad) 10/3/2015 Influenza Vaccine PF 12/26/2014  2:06 PM  
 Pneumococcal Polysaccharide (PPSV-23) 9/1/2017 12:00 AM, 7/7/2013 12:00 AM  
 TB Skin Test (PPD) Intradermal 3/11/2009 12:00 AM  
  
 Not reviewed this visit You Were Diagnosed With   
  
 Codes Comments Hospital discharge follow-up    -  Primary ICD-10-CM: [facility] ICD-9-CM: V67.59 Generalized abdominal pain     ICD-10-CM: R10.84 ICD-9-CM: 789.07   
 Left inguinal pain     ICD-10-CM: R10.32 
ICD-9-CM: 789.09 Cough     ICD-10-CM: R05 ICD-9-CM: 269. 2 Vitals BP Pulse Temp Resp Height(growth percentile) Weight(growth percentile) 152/66 (BP 1 Location: Right arm, BP Patient Position: Sitting) 61 98.7 °F (37.1 °C) (Oral) 20 5' 8\" (1.727 m) 135 lb 9.6 oz (61.5 kg) SpO2 BMI Smoking Status 95% 20.62 kg/m2 Former Smoker Vitals History BMI and BSA Data Body Mass Index Body Surface Area  
 20.62 kg/m 2 1.72 m 2 Preferred Pharmacy Pharmacy Name Phone [facility], [address] [place] [address] [phone] Your Updated Medication List  
  
   
This list is accurate as of 5/9/18  4:59 PM.  Always use your most recent med list.  
  
  
  
  
 albuterol 90 mcg/actuation inhaler Commonly known as:  PROVENTIL HFA, VENTOLIN HFA, PROAIR HFA Take 1 Puff by inhalation every six (6) hours as needed for Wheezing. aspirin 81 mg chewable tablet 81 mg.  
  
 COUGH-SORE THROAT LOZENGE PO Take 1 Lozenge by mouth three (3) times daily. guaiFENesin-codeine 100-10 mg/5 mL solution Commonly known as:  ROBITUSSIN AC Take 5 mL by mouth three (3) times daily as needed for Cough. Max Daily Amount: 15 mL. losartan 100 mg tablet Commonly known as:  COZAAR Take 1 Tab by mouth daily. Indications: hypertension  
  
 metFORMIN 500 mg tablet Commonly known as:  GLUCOPHAGE Take 1 Tab by mouth two (2) times daily (with meals). Indications: type 2 diabetes mellitus  
  
 metoprolol succinate 50 mg XL tablet Commonly known as:  TOPROL-XL Take 1 Tab by mouth daily. Indications: hypertension  
  
 oxyCODONE IR 10 mg Tab immediate release tablet Commonly known as:  Nani Martínez Take  by mouth every four (4) hours as needed. pregabalin 150 mg capsule Commonly known as:  Levon Jensen Take 1 Cap by mouth two (2) times a day. Max Daily Amount: 300 mg. Indications: Diabetic Peripheral Neuropathy  
  
 therapeutic multivitamin-minerals tablet Commonly known as:  THERAGRAN-M Take 1 Tab by Mouth Once a Day. thiamine 100 mg tablet Commonly known as:  B-1  
100 mg.  
  
 trospium 20 mg tablet Commonly known as:  Maki [place] Take 20 mg by mouth two (2) times daily as needed. Prescriptions Printed Refills  
 guaiFENesin-codeine (ROBITUSSIN AC) 100-10 mg/5 mL solution 0 Sig: Take 5 mL by mouth three (3) times daily as needed for Cough. Max Daily Amount: 15 mL. Class: Print Route: Oral  
  
Follow-up Instructions Return if symptoms worsen or fail to improve, for or as appointed. Introducing [facility]! [employer] introduces [employer] patient portal. Now you can access parts of your medical record, email your doctor's office, and request medication refills online. 1. In your internet browser, go to [email or web address] 2. Click on the First Time User? Click Here link in the Sign In box. You will see the New Member Sign Up page. 3. Enter your [employer] Access Code exactly as it appears below. You will not need to use this code after youve completed the sign-up process. If you do not sign up before the expiration date, you must request a new code. · [employer] Access Code: [email or web address] Expires: 6/18/2018  3:57 PM 
 
4. Enter the last four digits of your Social Security Number (xxxx) and Date of Birth (mm/dd/yyyy) as indicated and click Submit. You will be taken to the next sign-up page. 5. Create a [employer] ID. This will be your [employer] login ID and cannot be changed, so think of one that is secure and easy to remember. 6. Create a [employer] password. You can change your password at any time. 7. Enter your Password Reset Question and Answer. This can be used at a later time if you forget your password. 8. Enter your e-mail address.  You will receive e-mail notification when new information is available in [employer]. 9. Click Sign Up. You can now view and download portions of your medical record. 10. Click the Download Summary menu link to download a portable copy of your medical information. If you have questions, please visit the Frequently Asked Questions section of the [employer] website. Remember, [employer] is NOT to be used for urgent needs. For medical emergencies, dial 911. Now available from your iPhone and Android! Please provide this summary of care documentation to your next provider. Your primary care clinician is listed as [facility]. If you have any questions after today's visit, please call [phone].

## 2018-05-09 NOTE — PROGRESS NOTES
HISTORY OF PRESENT ILLNESS  Claudio Yap is a 80 y.o. male. Visit Vitals    /66 (BP 1 Location: Right arm, BP Patient Position: Sitting)    Pulse 61    Temp 98.7 °F (37.1 °C) (Oral)    Resp 20    Ht 5' 8\" (1.727 m)    Wt 135 lb 9.6 oz (61.5 kg)    SpO2 95%    BMI 20.62 kg/m2       HPI Comments: He was recently in the hospital from 4/30/18 through 5/2/18 at [facility] with vascular problem--thrombosed LLE. He is still on antibiotics for this    Subsequently he reports a TIA. He reports a bit of lightheadedness persists. But the chart says he was in the hospital with abdominal pain and nausea. They do not mention a stroke. But these sxs have resolved. His discharge AVS mentioned signs and sxs of TIA/stroke but no diagnosis of such. He described left leg weakness and spinning sensation with nausea. 5 days ago he reported a fever and chills while out and about. But this resolved. He has 3 more days of IV antibiotics    Does have a nurse coming out. Hospital Follow Up   The history is provided by the patient (see comments). This is a new problem. URI    The history is provided by the patient. This is a new problem. The current episode started more than 2 days ago. The problem has not changed since onset. There has been no fever. Associated symptoms include nausea, congestion, sneezing and cough. He has tried nothing for the symptoms. Review of Systems   HENT: Positive for congestion and sneezing. Respiratory: Positive for cough. Gastrointestinal: Positive for nausea. Physical Exam   Constitutional: He is oriented to person, place, and time. He appears well-developed and well-nourished. No distress. Cardiovascular: Normal rate and regular rhythm. Pulmonary/Chest: Effort normal. He has wheezes. Scattered wheezes and rhonchi   Musculoskeletal: He exhibits no edema. LLE wound is nearly healed on shin area.     Left groins still has slight drainage and a granulating area   Neurological: He is alert and oriented to person, place, and time. Skin: Skin is warm and dry. He is not diaphoretic. Psychiatric: He has a normal mood and affect. Nursing note and vitals reviewed. ASSESSMENT and PLAN    ICD-10-CM ICD-9-CM    1. Hospital discharge follow-up Z09 V67.59    2. Generalized abdominal pain R10.84 789.07 oxyCODONE IR (ROXICODONE) 10 mg tab immediate release tablet   3. Left inguinal pain R10.32 789.09 oxyCODONE IR (ROXICODONE) 10 mg tab immediate release tablet   4. Cough R05 786.2 guaiFENesin-codeine (ROBITUSSIN AC) 100-10 mg/5 mL solution       Available hospital/ER record reviewed    Abdominal sxs have resolved    Groin is still healing and has some pain. Will f/u with vascular and ID    Cough/URI. Will add cough med as he is already on an antibiotic. Robitussin AC    Requesting refill on the oxycodone 10 mg they gave him instead of the percocet. He has long standing chronic pain in his LLE thought due to vascular injury. Vascular surgery will not write pain meds for him.     F/u 6 weeks as appointed

## 2018-05-09 NOTE — PROGRESS NOTES
ROOM # 5  Pt presents for hospital follow up due to TIA. Pt reports having PICC line in R arm and takes abx everyday at home IV. Claudio Yap presents today for   Chief Complaint   Patient presents with   [facility] Follow Up     possible stroke       Claudio Yap preferred language for health care discussion is english/other. Is someone accompanying this pt? no    Is the patient using any DME equipment during OV? no    Depression Screening:  PHQ over the last two weeks 11/10/2017 6/22/2017 2/6/2017 7/11/2016 5/12/2015 7/10/2014   Little interest or pleasure in doing things More than half the days Not at all Not at all Not at all Not at all Not at all   Feeling down, depressed or hopeless More than half the days Not at all Not at all Not at all Not at all Not at all   Total Score PHQ 2 4 0 0 0 0 0       Learning Assessment:  Learning Assessment 7/10/2014   PRIMARY LEARNER Patient   HIGHEST LEVEL OF EDUCATION - PRIMARY LEARNER  GRADUATED HIGH SCHOOL OR GED   BARRIERS PRIMARY LEARNER NONE   CO-LEARNER CAREGIVER No   PRIMARY LANGUAGE ENGLISH   LEARNER PREFERENCE PRIMARY DEMONSTRATION   ANSWERED BY self   RELATIONSHIP SELF       Abuse Screening:  Abuse Screening Questionnaire 11/10/2017 5/12/2015   Do you ever feel afraid of your partner? N N   Are you in a relationship with someone who physically or mentally threatens you? N N   Is it safe for you to go home? Y Y       Fall Risk  Fall Risk Assessment, last 12 mths 11/10/2017 6/22/2017 2/6/2017 7/11/2016   Able to walk? Yes Yes Yes Yes   Fall in past 12 months? No No No No       Health Maintenance reviewed and discussed per provider. Yes    Claudio Yap is due for   Health Maintenance Due   Topic Date Due    EYE EXAM RETINAL OR DILATED Q1  03/03/1960    MEDICARE YEARLY EXAM  03/14/2018     Please order/place referral if appropriate. Advance Directive:  1. Do you have an advance directive in place? Patient Reply: yes    2.  If not, would you like material regarding how to put one in place? Patient Reply: no    Coordination of Care:  1. Have you been to the ER, urgent care clinic since your last visit? Hospitalized since your last visit? Yes, Stroke    2. Have you seen or consulted any other health care providers outside of the [facility] since your last visit? Include any pap smears or colon screening.  yes

## 2018-05-24 ENCOUNTER — PATIENT OUTREACH (OUTPATIENT)
Dept: INTERNAL MEDICINE CLINIC | Age: 68
End: 2018-05-24

## 2018-05-30 ENCOUNTER — OFFICE VISIT (OUTPATIENT)
Dept: INTERNAL MEDICINE CLINIC | Age: 68
End: 2018-05-30

## 2018-05-30 VITALS
HEART RATE: 63 BPM | WEIGHT: 131.6 LBS | SYSTOLIC BLOOD PRESSURE: 138 MMHG | HEIGHT: 68 IN | OXYGEN SATURATION: 100 % | BODY MASS INDEX: 19.94 KG/M2 | TEMPERATURE: 95.2 F | DIASTOLIC BLOOD PRESSURE: 93 MMHG | RESPIRATION RATE: 15 BRPM

## 2018-05-30 DIAGNOSIS — R31.9 HEMATURIA, UNSPECIFIED TYPE: ICD-10-CM

## 2018-05-30 DIAGNOSIS — M79.605 PAIN OF LEFT LOWER EXTREMITY: Primary | ICD-10-CM

## 2018-05-30 RX ORDER — ATORVASTATIN CALCIUM 40 MG/1
TABLET, FILM COATED ORAL
COMMUNITY
Start: 2018-03-20 | End: 2018-05-31

## 2018-05-30 RX ORDER — NORTRIPTYLINE HYDROCHLORIDE 50 MG/1
50 CAPSULE ORAL DAILY
COMMUNITY
Start: 2018-03-21 | End: 2018-07-26 | Stop reason: SDUPTHER

## 2018-05-30 RX ORDER — MELOXICAM 15 MG/1
15 TABLET ORAL
Qty: 90 TAB | Refills: 1 | Status: SHIPPED | OUTPATIENT
Start: 2018-05-30 | End: 2018-07-26

## 2018-05-30 RX ORDER — FAMOTIDINE 20 MG/1
TABLET, FILM COATED ORAL
COMMUNITY
Start: 2018-04-12 | End: 2018-07-05

## 2018-05-30 RX ORDER — CEFTAROLINE FOSAMIL 600 MG/20ML
POWDER, FOR SOLUTION INTRAVENOUS
COMMUNITY
Start: 2018-05-02 | End: 2018-07-05

## 2018-05-30 NOTE — PROGRESS NOTES
ROOM # 10    Katherine Tran presents today for   Chief Complaint   Patient presents with    Blood in Urine    Groin Swelling     left side       Katherine Tran preferred language for health care discussion is english/other. Is someone accompanying this pt? no    Is the patient using any DME equipment during OV? no    Depression Screening:  PHQ over the last two weeks 5/30/2018 11/10/2017 6/22/2017 2/6/2017 7/11/2016 5/12/2015 7/10/2014   Little interest or pleasure in doing things Several days More than half the days Not at all Not at all Not at all Not at all Not at all   Feeling down, depressed or hopeless Several days More than half the days Not at all Not at all Not at all Not at all Not at all   Total Score PHQ 2 2 4 0 0 0 0 0       Learning Assessment:  Learning Assessment 7/10/2014   PRIMARY LEARNER Patient   HIGHEST LEVEL OF EDUCATION - PRIMARY LEARNER  GRADUATED HIGH SCHOOL OR GED   BARRIERS PRIMARY LEARNER NONE   CO-LEARNER CAREGIVER No   PRIMARY LANGUAGE ENGLISH   LEARNER PREFERENCE PRIMARY DEMONSTRATION   ANSWERED BY self   RELATIONSHIP SELF       Abuse Screening:  Abuse Screening Questionnaire 5/30/2018 11/10/2017 5/12/2015   Do you ever feel afraid of your partner? N N N   Are you in a relationship with someone who physically or mentally threatens you? N N N   Is it safe for you to go home? Amanda Villafana       Fall Risk  Fall Risk Assessment, last 12 mths 5/30/2018 11/10/2017 6/22/2017 2/6/2017 7/11/2016   Able to walk? Yes Yes Yes Yes Yes   Fall in past 12 months? No No No No No       Health Maintenance reviewed and discussed per provider. Yes    Katherine Tran is due for   Health Maintenance Due   Topic Date Due    EYE EXAM RETINAL OR DILATED Q1  03/03/1960    MEDICARE YEARLY EXAM  03/14/2018     Please order/place referral if appropriate. Advance Directive:  1. Do you have an advance directive in place? Patient Reply: no    2. If not, would you like material regarding how to put one in place? Patient Reply: no    Coordination of Care:  1. Have you been to the ER, urgent care clinic since your last visit? Hospitalized since your last visit? [facility] 2 days ago for hematuria    2. Have you seen or consulted any other health care providers outside of the [address] [place] [address] since your last visit? Include any pap smears or colon screening.  no

## 2018-05-30 NOTE — PROGRESS NOTES
FAMILY MEDICINE CLINIC NOTE    S: The patient presents with a complaint of sharp pain in the LLE under the shin, this started after he had a fasciotomy a few months ago but has been getting worse over the last few weeks. He has been utilizing percocet and lyrica for the pain. He states that the pain is breaking through his normal analgesics. He also notes blood in his urine for the last 2 days. He has been to the ER at [facility], he has an appointment with urology tomorrow morning. Current Outpatient Prescriptions on File Prior to Visit   Medication Sig Dispense Refill    guaiFENesin-codeine (ROBITUSSIN AC) 100-10 mg/5 mL solution Take 5 mL by mouth three (3) times daily as needed for Cough. Max Daily Amount: 15 mL. 180 mL 0    oxyCODONE IR (ROXICODONE) 10 mg tab immediate release tablet Take 1 Tab by mouth every four (4) hours as needed. Max Daily Amount: 60 mg. Indications: Pain 60 Tab 0    metFORMIN (GLUCOPHAGE) 500 mg tablet Take 1 Tab by mouth two (2) times daily (with meals). Indications: type 2 diabetes mellitus 60 Tab 5    pregabalin (LYRICA) 150 mg capsule Take 1 Cap by mouth two (2) times a day. Max Daily Amount: 300 mg. Indications: Diabetic Peripheral Neuropathy 60 Cap 5    metoprolol succinate (TOPROL-XL) 50 mg XL tablet Take 1 Tab by mouth daily. Indications: hypertension 30 Tab 5    losartan (COZAAR) 100 mg tablet Take 1 Tab by mouth daily. Indications: hypertension 30 Tab 5    albuterol (PROVENTIL HFA, VENTOLIN HFA, PROAIR HFA) 90 mcg/actuation inhaler Take 1 Puff by inhalation every six (6) hours as needed for Wheezing. 1 Inhaler 5    therapeutic multivitamin-minerals (THERAGRAN-M) tablet Take 1 Tab by Mouth Once a Day.  thiamine (B-1) 100 mg tablet 100 mg.  aspirin 81 mg chewable tablet 81 mg.      dextromethorphan/benzocaine (COUGH-SORE THROAT LOZENGE PO) Take 1 Lozenge by mouth three (3) times daily.       trospium (SANCTURA) 20 mg tablet Take 20 mg by mouth two (2) times daily as needed. No current facility-administered medications on file prior to visit. Past Medical History:   Diagnosis Date    Acute occlusion of artery of lower extremity due to thromboembolism ([facility] 03/27/2018    LLE    Allergic rhinitis due to other allergen     Arthritis     multiple joints    Bile-induced gastritis 01/16/2017    by EGD    Bundle branch block, unspecified 12/28/09    incomplete right    Chronic airway obstruction, not elsewhere classified 6/24/09    Colon polyps 9/25/14    tubular adenoma    [facility] 2007    due to steroid for breathing/COPD    Hiatal hernia 01/16/2017    by EGD    Hypercholesterolemia 2-3 yrs    Hypertension 2007    Osteopenia     Peripheral neuropathy     RA (rheumatoid arthritis) ([facility] 2005    Dr. Mike Soliz cuff tear     bilat    Stroke [facility]) June 2014    TIA    Stroke 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     Social History Main Topics    Smoking status: Former Smoker     Packs/day: 1.00     Years: 40.00     Types: Cigarettes     Quit date: 7/10/2005    Smokeless tobacco: Never Used      Comment: quit 2005    Alcohol use Yes      Comment: seldom    Drug use: No    Sexual activity: Yes     Partners: Female     Birth control/ protection: None, Condom     Other Topics Concern    Not on file     Social History Narrative       Family History   Problem Relation Age of Onset    Asthma Mother     Elevated Lipids Mother     Heart Disease Mother     Hypertension Mother     Alcohol abuse Father     Arthritis-osteo Father     Cancer Father        O:  Visit Vitals    BP (!) 138/93 (BP 1 Location: Left arm, BP Patient Position: Sitting)    Pulse 63    Temp 95.2 °F (35.1 °C) (Oral)    Resp 15    Ht 5' 8\" (1.727 m)    Wt 131 lb 9.6 oz (59.7 kg)    SpO2 100%    BMI 20.01 kg/m2     NAD, comfortable  RRR, no murmurs  CTABL, no wheezing/ronchi/rales  Moderate TTP on the medial aspect of the left shin  Remote fasciotomy surgical scars LLE    80 y.o. male      ICD-10-CM ICD-9-CM    1. Pain of left lower extremity M79.605 729.5 meloxicam (MOBIC) 15 mg tablet  Continue percocet and lyrica   2.  Hematuria, unspecified type R31.9 599.70 Follow up with urology

## 2018-05-30 NOTE — MR AVS SNAPSHOT
[address] [place] [address] 
 
 
 [address] [id number] 
[phone] Patient: Rafaela Owens MRN: [id number] DYA:[id number] Visit Information Date & Time Provider Department Dept. Phone Encounter #  
 5/30/2018  1:45 PM Abelardo Valle, [address] [phone] [id number] Your Appointments 5/31/2018 10:30 AM  
ACUTE CARE with Makeda Gaitan MD  
[facility] [address] [facility] Appt Note: NP; pt seen in [facility] ER for gross hematuria. notes in [address] aware of VB location. [facility] 5-29-18  
 [address] [place] [phone]  
  
    
 6/20/2018  2:00 PM  
Office Visit with MD [address] ([facility] Appt Note: 3 mo f/u htn, dm, cholesterol [address]  
  
   
 [address] [facility] Upcoming Health Maintenance Date Due  
 EYE EXAM RETINAL OR DILATED Q1 3/3/1960 MEDICARE YEARLY EXAM 3/14/2018 DTaP/Tdap/Td series (1 - Tdap) 3/20/2019* GLAUCOMA SCREENING Q2Y 4/28/2020* HEMOGLOBIN A1C Q6M 7/9/2018 Influenza Age 5 to Adult 8/1/2018 Pneumococcal 65+ Low/Medium Risk (2 of 2 - PCV13) 9/1/2018 MICROALBUMIN Q1 11/10/2018 LIPID PANEL Q1 11/10/2018 COLONOSCOPY 1/18/2020 *Topic was postponed. The date shown is not the original due date. Allergies as of 5/30/2018  Review Complete On: 5/30/2018 By: Kelly Parkinson LPN No Known Allergies Current Immunizations  Reviewed on 2/6/2018 Name Date Influenza Vaccine 9/1/2017 12:00 AM, 9/12/2016, 10/1/2013 12:00 AM  
 Influenza Vaccine (Quad) 10/3/2015  Influenza Vaccine PF 12/26/2014  2:06 PM  
 Pneumococcal Polysaccharide (PPSV-23) 9/1/2017 12:00 AM, 7/7/2013 12:00 AM  
 TB Skin Test (PPD) Intradermal 3/11/2009 12:00 AM  
  
 Not reviewed this visit You Were Diagnosed With   
  
 Codes Comments Pain of left lower extremity    -  Primary ICD-10-CM: M79.605 ICD-9-CM: 729.5 Vitals BP Pulse Temp Resp Height(growth percentile) Weight(growth percentile) (!) 138/93 (BP 1 Location: Left arm, BP Patient Position: Sitting) 63 95.2 °F (35.1 °C) (Oral) 15 5' 8\" (1.727 m) 131 lb 9.6 oz (59.7 kg) SpO2 BMI Smoking Status 100% 20.01 kg/m2 Former Smoker Vitals History BMI and BSA Data Body Mass Index Body Surface Area 20.01 kg/m 2 1.69 m 2 Preferred Pharmacy Pharmacy Name Phone [facility] [address], [address] [place] [address] [phone] Your Updated Medication List  
  
   
This list is accurate as of 5/30/18  2:25 PM.  Always use your most recent med list.  
  
  
  
  
 albuterol 90 mcg/actuation inhaler Commonly known as:  PROVENTIL HFA, VENTOLIN HFA, PROAIR HFA Take 1 Puff by inhalation every six (6) hours as needed for Wheezing. aspirin 81 mg chewable tablet 81 mg.  
  
 atorvastatin 40 mg tablet Commonly known as:  LIPITOR COUGH-SORE THROAT LOZENGE PO Take 1 Lozenge by mouth three (3) times daily. famotidine 20 mg tablet Commonly known as:  PEPCID  
  
 guaiFENesin-codeine 100-10 mg/5 mL solution Commonly known as:  ROBITUSSIN AC Take 5 mL by mouth three (3) times daily as needed for Cough. Max Daily Amount: 15 mL. losartan 100 mg tablet Commonly known as:  COZAAR Take 1 Tab by mouth daily. Indications: hypertension  
  
 meloxicam 15 mg tablet Commonly known as:  MOBIC Take 1 Tab by mouth daily as needed for Pain.  
  
 metFORMIN 500 mg tablet Commonly known as:  GLUCOPHAGE Take 1 Tab by mouth two (2) times daily (with meals). Indications: type 2 diabetes mellitus  
  
 metoprolol succinate 50 mg XL tablet Commonly known as:  TOPROL-XL Take 1 Tab by mouth daily. Indications: hypertension  
  
 nortriptyline 50 mg capsule Commonly known as:  PAMELOR  
daily. oxyCODONE IR 10 mg Tab immediate release tablet Commonly known as:  Verlan Jorge Take 1 Tab by mouth every four (4) hours as needed. Max Daily Amount: 60 mg. Indications: Pain  
  
 pregabalin 150 mg capsule Commonly known as:  Carlynn Annie Take 1 Cap by mouth two (2) times a day. Max Daily Amount: 300 mg. Indications: Diabetic Peripheral Neuropathy SIMVASTATIN PO Take 1 Tab by Mouth Once a Day. TEFLARO injection Generic drug:  cefTARoline  
  
 therapeutic multivitamin-minerals tablet Commonly known as:  THERAGRAN-M Take 1 Tab by Mouth Once a Day. thiamine 100 mg tablet Commonly known as:  B-1  
100 mg.  
  
 trospium 20 mg tablet Commonly known as:  Maki [place] Take 20 mg by mouth two (2) times daily as needed. Prescriptions Sent to Pharmacy Refills  
 meloxicam (MOBIC) 15 mg tablet 1 Sig: Take 1 Tab by mouth daily as needed for Pain. Class: Normal  
 Pharmacy: [address] #: [phone] Route: Oral  
  
Introducing [facility]! Manuela Alvarez introduces [employer] patient portal. Now you can access parts of your medical record, email your doctor's office, and request medication refills online. 1. In your internet browser, go to [email or web address] 2. Click on the First Time User? Click Here link in the Sign In box. You will see the New Member Sign Up page. 3. Enter your [employer] Access Code exactly as it appears below. You will not need to use this code after youve completed the sign-up process. If you do not sign up before the expiration date, you must request a new code. · [employer] Access Code: [email or web address] Expires: 6/18/2018  3:57 PM 
 
 4. Enter the last four digits of your Social Security Number (xxxx) and Date of Birth (mm/dd/yyyy) as indicated and click Submit. You will be taken to the next sign-up page. 5. Create a [employer] ID. This will be your [employer] login ID and cannot be changed, so think of one that is secure and easy to remember. 6. Create a [employer] password. You can change your password at any time. 7. Enter your Password Reset Question and Answer. This can be used at a later time if you forget your password. 8. Enter your e-mail address. You will receive e-mail notification when new information is available in [address] 9. Click Sign Up. You can now view and download portions of your medical record. 10. Click the Download Summary menu link to download a portable copy of your medical information. If you have questions, please visit the Frequently Asked Questions section of the [employer] website. Remember, [employer] is NOT to be used for urgent needs. For medical emergencies, dial 911. Now available from your iPhone and Android! Please provide this summary of care documentation to your next provider. Your primary care clinician is listed as [facility]. If you have any questions after today's visit, please call [phone].

## 2018-06-07 ENCOUNTER — OFFICE VISIT (OUTPATIENT)
Dept: INTERNAL MEDICINE CLINIC | Age: 68
End: 2018-06-07

## 2018-06-07 VITALS
WEIGHT: 132 LBS | BODY MASS INDEX: 20 KG/M2 | HEIGHT: 68 IN | SYSTOLIC BLOOD PRESSURE: 184 MMHG | OXYGEN SATURATION: 97 % | RESPIRATION RATE: 16 BRPM | TEMPERATURE: 96 F | DIASTOLIC BLOOD PRESSURE: 82 MMHG | HEART RATE: 62 BPM

## 2018-06-07 DIAGNOSIS — Z01.818 PRE-OP EVALUATION: Primary | ICD-10-CM

## 2018-06-07 DIAGNOSIS — I10 ESSENTIAL HYPERTENSION: Chronic | ICD-10-CM

## 2018-06-07 RX ORDER — LOSARTAN POTASSIUM AND HYDROCHLOROTHIAZIDE 25; 100 MG/1; MG/1
1 TABLET ORAL DAILY
Qty: 30 TAB | Refills: 0 | Status: SHIPPED | OUTPATIENT
Start: 2018-06-07 | End: 2018-07-07

## 2018-06-07 NOTE — PROGRESS NOTES
HISTORY OF PRESENT ILLNESS  Alie Gamino is a 80 y.o. male. HPI Comments: Patient was here for pre op clearance. No active complaints. Patient is taking his medications regularly. Pre-op Exam   Pertinent negatives include no chest pain, no abdominal pain, no headaches and no shortness of breath. Medication Refill   Pertinent negatives include no chest pain, no abdominal pain, no headaches and no shortness of breath. Review of Systems   Constitutional: Negative for chills, diaphoresis, fever, malaise/fatigue and weight loss. HENT: Negative for congestion, ear discharge, ear pain, hearing loss, nosebleeds, sore throat and tinnitus. Eyes: Negative for blurred vision, double vision, photophobia, pain, discharge and redness. Respiratory: Negative for cough, hemoptysis, sputum production, shortness of breath, wheezing and stridor. Cardiovascular: Negative for chest pain, palpitations, orthopnea, claudication, leg swelling and PND. Gastrointestinal: Negative for abdominal pain, blood in stool, constipation, diarrhea, heartburn, melena, nausea and vomiting. Genitourinary: Negative for dysuria, flank pain, frequency and urgency. Musculoskeletal: Negative for back pain, falls, joint pain, myalgias and neck pain. Skin: Negative for itching and rash. Neurological: Negative for dizziness, tingling, tremors, sensory change, speech change, focal weakness, seizures, loss of consciousness, weakness and headaches. Endo/Heme/Allergies: Negative for environmental allergies. Does not bruise/bleed easily. Psychiatric/Behavioral: Negative for depression, hallucinations, memory loss, substance abuse and suicidal ideas. The patient is not nervous/anxious and does not have insomnia. Physical Exam   Constitutional: He is oriented to person, place, and time. He appears well-developed and well-nourished. No distress. HENT:   Head: Normocephalic and atraumatic.    Right Ear: External ear normal. Left Ear: External ear normal.   Nose: Nose normal.   Mouth/Throat: Oropharynx is clear and moist. No oropharyngeal exudate. Eyes: EOM are normal. Right eye exhibits no discharge. Left eye exhibits no discharge. Neck: Normal range of motion. Neck supple. No thyromegaly present. Cardiovascular: Regular rhythm and normal heart sounds. Pulmonary/Chest: Effort normal and breath sounds normal. No respiratory distress. He exhibits no tenderness. Abdominal: Soft. Bowel sounds are normal. He exhibits no distension and no mass. There is no tenderness. Musculoskeletal: Normal range of motion. He exhibits no edema or tenderness. Lymphadenopathy:     He has no cervical adenopathy. Neurological: He is alert and oriented to person, place, and time. He has normal reflexes. No cranial nerve deficit. Skin: Skin is warm and dry. No rash noted. He is not diaphoretic. No erythema. Psychiatric: He has a normal mood and affect. Nursing note and vitals reviewed. ASSESSMENT and PLAN    ICD-10-CM ICD-9-CM    1. Pre-op evaluation Z01.818 V72.84    2. Essential hypertension I10 401.9 losartan-hydroCHLOROthiazide (HYZAAR) 100-25 mg per tablet   patient's systolic blood pressure is high consistently in last few visits. Medication is changed to lower the blood pressure. All recent labs were reviewed. There no other contraindications for the surgery.

## 2018-06-07 NOTE — PROGRESS NOTES
Identified pt with two pt identifiers(name and ). Reviewed record in preparation for visit and have obtained necessary documentation. Chief Complaint   Patient presents with    Pre-op Exam     (Rm #9) stent exchange surgery on 6/15/18; pt states EKG done 18 @ [address] Medication Refill     lyrica        Health Maintenance Due   Topic    EYE EXAM RETINAL OR DILATED Q1    [address]    Mr. Papi Olmstead has a reminder for a \"due or due soon\" health maintenance. I have asked that he contact his primary care provider for follow-up on this health maintenance. Coordination of Care Questionnaire:  :   1) Have you been to an emergency room, urgent care clinic since your last visit? no   Hospitalized since your last visit? no             2. Have seen or consulted any other health care provider since your last visit? YES  If yes, where when, and reason for visit? 18: Dr. Deborah Brar (uro)  18: EKG    3) Do you have an Advanced Directive/ Living Will in place? NO  If yes, do we have a copy on file NO  If no, would you like information NO    Patient is accompanied by self I have received verbal consent from Rafaela Owens to discuss any/all medical information while they are present in the room.

## 2018-06-07 NOTE — MR AVS SNAPSHOT
[facility] 
 
 
 [address] [address] 
[phone] Patient: Yoly Cabrera MRN: [id number] FJE:[id number]4 Visit Information Date & Time Provider Department Dept. Phone Encounter #  
 6/7/2018 12:45 PM Medardo Nose, NP [address] [phone] Your Appointments 6/7/2018 12:45 PM  
Office Visit with Medardo Nose, NP [address] ([facility]) Appt Note: PRE-OP CLEARANCE, STENT EXCHANGE SURGERY, SURGERY DATE IS 06/15/2018, DR. Dustin ROCHA ([phone]) [phone]; $0 CP, $43.36 BAL, 05/31/2018 VC; mobile number disconnected dlf 6/6/18; patient confirmed 06/06/18 FR  
 [address]  
  
    
 6/20/2018  2:00 PM  
Office Visit with MD [address] ([facility]) Appt Note: 3 mo f/u htn, dm, cholesterol [address] [facility]  
  
   
 [address] [facility] Upcoming Health Maintenance Date Due  
 EYE EXAM RETINAL OR DILATED Q1 3/3/1960 MEDICARE YEARLY EXAM 3/14/2018 DTaP/Tdap/Td series (1 - Tdap) 3/20/2019* GLAUCOMA SCREENING Q2Y 4/28/2020* HEMOGLOBIN A1C Q6M 7/9/2018 Influenza Age 5 to Adult 8/1/2018 Pneumococcal 65+ Low/Medium Risk (2 of 2 - PCV13) 9/1/2018 MICROALBUMIN Q1 11/10/2018 LIPID PANEL Q1 11/10/2018 COLONOSCOPY 1/18/2020 *Topic was postponed. The date shown is not the original due date. Allergies as of 6/7/2018  Review Complete On: 6/7/2018 By: Maricarmen Beltran LPN No Known Allergies Current Immunizations  Reviewed on 2/6/2018 Name Date Influenza Vaccine 9/1/2017 12:00 AM, 9/12/2016, 10/1/2013 12:00 AM  
 Influenza Vaccine (Quad) 10/3/2015  Influenza Vaccine PF 12/26/2014  2:06 PM  
 Pneumococcal Polysaccharide (PPSV-23) 9/1/2017 12:00 AM, 7/7/2013 12:00 AM  
 TB Skin Test (PPD) Intradermal 3/11/2009 12:00 AM  
  
 Not reviewed this visit You Were Diagnosed With   
  
 Codes Comments Pre-op evaluation    -  Primary ICD-10-CM: Z19.916 ICD-9-CM: V72.84 Essential hypertension     ICD-10-CM: I10 
ICD-9-CM: 401.9 Vitals BP Pulse Temp Resp Height(growth percentile) Weight(growth percentile) 184/82 (BP 1 Location: Left arm, BP Patient Position: Sitting) 62 96 °F (35.6 °C) (Oral) 16 5' 8\" (1.727 m) 132 lb (59.9 kg) SpO2 BMI Smoking Status 97% 20.07 kg/m2 Former Smoker Vitals History BMI and BSA Data Body Mass Index Body Surface Area 20.07 kg/m 2 1.7 m 2 Preferred Pharmacy Pharmacy Name Phone [address], [address] [place] [address] [phone] Your Updated Medication List  
  
   
This list is accurate as of 6/7/18 12:32 PM.  Always use your most recent med list.  
  
  
  
  
 albuterol 90 mcg/actuation inhaler Commonly known as:  PROVENTIL HFA, VENTOLIN HFA, PROAIR HFA Take 1 Puff by inhalation every six (6) hours as needed for Wheezing. aspirin 81 mg chewable tablet 81 mg.  
  
 famotidine 20 mg tablet Commonly known as:  PEPCID  
  
 losartan-hydroCHLOROthiazide 100-25 mg per tablet Commonly known as:  HYZAAR Take 1 Tab by mouth daily for 30 days. meloxicam 15 mg tablet Commonly known as:  MOBIC Take 1 Tab by mouth daily as needed for Pain.  
  
 metFORMIN 500 mg tablet Commonly known as:  GLUCOPHAGE Take 1 Tab by mouth two (2) times daily (with meals). Indications: type 2 diabetes mellitus  
  
 metoprolol succinate 50 mg XL tablet Commonly known as:  TOPROL-XL Take 1 Tab by mouth daily. Indications: hypertension  
  
 nitrofurantoin 50 mg capsule Commonly known as:  MACRODANTIN Take 1 Cap by mouth two (2) times a day. nortriptyline 50 mg capsule Commonly known as:  PAMELOR Take 50 mg by mouth daily. oxyCODONE IR 10 mg Tab immediate release tablet Commonly known as:  [place] Lisa Take 1 Tab by mouth every four (4) hours as needed. Max Daily Amount: 60 mg. Indications: Pain  
  
 pregabalin 150 mg capsule Commonly known as:  Precious Reis Take 1 Cap by mouth two (2) times a day. Max Daily Amount: 300 mg. Indications: Diabetic Peripheral Neuropathy SIMVASTATIN PO Take 1 Tab by Mouth Once a Day. TEFLARO injection Generic drug:  cefTARoline  
  
 therapeutic multivitamin-minerals tablet Commonly known as:  THERAGRAN-M Take 1 Tab by Mouth Once a Day. thiamine 100 mg tablet Commonly known as:  B-1  
100 mg.  
  
 trospium 20 mg tablet Commonly known as:  Maki Wilton Take 20 mg by mouth two (2) times daily as needed. Prescriptions Sent to Pharmacy Refills  
 losartan-hydroCHLOROthiazide (HYZAAR) 100-25 mg per tablet 0 Sig: Take 1 Tab by mouth daily for 30 days. Class: Normal  
 Pharmacy: [address] [place] [address] #: [phone] Route: Oral  
  
Patient Instructions Diabetes Blood Sugar Emergencies: Your Action Plan How can you prevent a blood sugar emergency? An important part of living with diabetes is keeping your blood sugar in your target range. You'll need to know what to do if it's too high or too low. Managing your blood sugar levels helps you avoid emergencies. This care sheet will teach you about the signs of high and low blood sugar. It will help you make an action plan with your doctor for when these signs occur. Low blood sugar is more likely to happen if you take certain medicines for diabetes. It can also happen if you skip a meal, drink alcohol, or exercise more than usual. 
You may get high blood sugar if you eat differently than you normally do. One example is eating more carbohydrate than usual. Having a cold, the flu, or other sudden illness can also cause high blood sugar levels. Levels can also rise if you miss a dose of medicine. Any change in how you take your medicine may affect your blood sugar level. So it's important to work with your doctor before you make any changes. Check your blood sugar Work with your doctor to fill in the blank spaces below that apply to you. Track your levels, know your target range, and write down ways you can get your blood sugar back in your target range. A log book can help you track your levels. Take the book to all of your medical appointments. · Check your blood sugar _____ times a day, at these times:________________________________________________. (For example: Before meals, at bedtime, before exercise, during exercise, other.) · Your blood sugar target range before a meal is ___________________. Your blood sugar target range after a meal is _______________________. · Do gkvd-___________________________________________________-jm get your blood sugar back within your safe range if your blood sugar results are _________________________________________. (For example: Less than 70 or above 250 mg/dL.) Call your doctor when your blood sugar results are ___________________________________. (For example: Less than 70 or above 250 mg/dL.) What are the symptoms of low and high blood sugar? Common symptoms of low blood sugar are sweating and feeling shaky, weak, hungry, or confused. Symptoms can start quickly. Common symptoms of high blood sugar are feeling very thirsty or very hungry. You may also pass urine more often than usual. You may have blurry vision and may lose weight without trying. But some people may have high or low blood sugar without having any symptoms. That's a good reason to check your blood sugar on a regular schedule. What should you do if you have symptoms? Work with your doctor to fill in the blank spaces below that apply to you. Low blood sugar If you have symptoms of low blood sugar, check your blood sugar. If it's below _____ ( for example, below 70), eat or drink a quick-sugar food that has about 15 grams of carbohydrate. Your goal is to get your level back to your safe range. Check your blood sugar again 15 minutes later. If it's still not in your target range, take another 15 grams of carbohydrate and check your blood sugar again in 15 minutes. Repeat this until you reach your target. Then go back to your regular testing schedule. When you have low blood sugar, it's best to stop or reduce any physical activity until your blood sugar is back in your target range and is stable. If you must stay active, eat or drink 30 grams of carbohydrate. Then check your blood sugar again in 15 minutes. If it's not in your target range, take another 30 grams of carbohydrates. Check your blood sugar again in 15 minutes. Keep doing this until you reach your target. You can then go back to your regular testing schedule. If your symptoms or blood sugar levels are getting worse or have not improved after 15 minutes, seek medical care right away. Here are some examples of quick-sugar foods with 15 grams of carbohydrate: · 3 or 4 glucose tablets · 1 tube of glucose gel · Hard candy (such as 3 Jolly Ranchers or 5 to [employer]) · ½ cup to ¾ cup (4 to 6 ounces) of fruit juice or regular (not diet) soda High blood sugar If you have symptoms of high blood sugar, check your blood sugar. Your goal is to get your level back to your target range. If it's above ______ ( for example, above 250), follow these steps: · If you missed a dose of your diabetes medicine, take it now. Take only the amount of medicine that you have been prescribed. Do not take more or less medicine. · Give yourself insulin if your doctor has prescribed it for high blood sugar. · Test for ketones, if the doctor told you to do so. If the results of the ketone test show a moderate-to-large amount of ketones, call the doctor for advice. · Wait 30 minutes after you take the extra insulin or the missed medicine. Check your blood sugar again. If your symptoms or blood sugar levels are getting worse or have not improved after taking these steps, seek medical care right away. Follow-up care is a key part of your treatment and safety. Be sure to make and go to all appointments, and call your doctor if you are having problems. It's also a good idea to know your test results and keep a list of the medicines you take. Where can you learn more? Go to http://don-peggy.info/. Enter F390 in the search box to learn more about \"Diabetes Blood Sugar Emergencies: Your Action Plan. \" Current as of: March 13, 2017 Content Version: 11.4 © 6868-1825 [employer]. Care instructions adapted under license by [employer] (which disclaims liability or warranty for this information). If you have questions about a medical condition or this instruction, always ask your healthcare professional. [place] [address] any warranty or liability for your use of this information. High Blood Pressure: Care Instructions Your Care Instructions If your blood pressure is usually above 140/90, you have high blood pressure, or hypertension. That means the top number is 140 or higher or the bottom number is 90 or higher, or both. Despite what a lot of people think, high blood pressure usually doesn't cause headaches or make you feel dizzy or lightheaded. It usually has no symptoms. But it does increase your risk for heart attack, stroke, and kidney or eye damage. The higher your blood pressure, the more your risk increases. Your doctor will give you a goal for your blood pressure.  Your goal will be based on your health and your age. An example of a goal is to keep your blood pressure below 140/90. Lifestyle changes, such as eating healthy and being active, are always important to help lower blood pressure. You might also take medicine to reach your blood pressure goal. 
Follow-up care is a key part of your treatment and safety. Be sure to make and go to all appointments, and call your doctor if you are having problems. It's also a good idea to know your test results and keep a list of the medicines you take. How can you care for yourself at home? Medical treatment · If you stop taking your medicine, your blood pressure will go back up. You may take one or more types of medicine to lower your blood pressure. Be safe with medicines. Take your medicine exactly as prescribed. Call your doctor if you think you are having a problem with your medicine. · Talk to your doctor before you start taking aspirin every day. Aspirin can help certain people lower their risk of a heart attack or stroke. But taking aspirin isn't right for everyone, because it can cause serious bleeding. · See your doctor regularly. You may need to see the doctor more often at first or until your blood pressure comes down. · If you are taking blood pressure medicine, talk to your doctor before you take decongestants or anti-inflammatory medicine, such as ibuprofen. Some of these medicines can raise blood pressure. · Learn how to check your blood pressure at home. Lifestyle changes · Stay at a healthy weight. This is especially important if you put on weight around the waist. Losing even 10 pounds can help you lower your blood pressure. · If your doctor recommends it, get more exercise. Walking is a good choice. Bit by bit, increase the amount you walk every day. Try for at least 30 minutes on most days of the week. You also may want to swim, bike, or do other activities. · Avoid or limit alcohol. Talk to your doctor about whether you can drink any alcohol. · Try to limit how much sodium you eat to less than 2,300 milligrams (mg) a day. Your doctor may ask you to try to eat less than 1,500 mg a day. · Eat plenty of fruits (such as bananas and oranges), vegetables, legumes, whole grains, and low-fat dairy products. · Lower the amount of saturated fat in your diet. Saturated fat is found in animal products such as milk, cheese, and meat. Limiting these foods may help you lose weight and also lower your risk for heart disease. · Do not smoke. Smoking increases your risk for heart attack and stroke. If you need help quitting, talk to your doctor about stop-smoking programs and medicines. These can increase your chances of quitting for good. When should you call for help? Call 911 anytime you think you may need emergency care. This may mean having symptoms that suggest that your blood pressure is causing a serious heart or blood vessel problem. Your blood pressure may be over 180/110. ? For example, call 911 if: 
? · You have symptoms of a heart attack. These may include: ¨ Chest pain or pressure, or a strange feeling in the chest. 
¨ Sweating. ¨ Shortness of breath. ¨ Nausea or vomiting. ¨ Pain, pressure, or a strange feeling in the back, neck, jaw, or upper belly or in one or both shoulders or arms. ¨ Lightheadedness or sudden weakness. ¨ A fast or irregular heartbeat. ? · You have symptoms of a stroke. These may include: 
¨ Sudden numbness, tingling, weakness, or loss of movement in your face, arm, or leg, especially on only one side of your body. ¨ Sudden vision changes. ¨ Sudden trouble speaking. ¨ Sudden confusion or trouble understanding simple statements. ¨ Sudden problems with walking or balance. ¨ A sudden, severe headache that is different from past headaches. ? · You have severe back or belly pain. ?Do not wait until your blood pressure comes down on its own. Get help right away. ?Call your doctor now or seek immediate care if: 
? · Your blood pressure is much higher than normal (such as 180/110 or higher), but you don't have symptoms. ? · You think high blood pressure is causing symptoms, such as: ¨ Severe headache. ¨ Blurry vision. ? Watch closely for changes in your health, and be sure to contact your doctor if: 
? · Your blood pressure measures 140/90 or higher at least 2 times. That means the top number is 140 or higher or the bottom number is 90 or higher, or both. ? · You think you may be having side effects from your blood pressure medicine. ? · Your blood pressure is usually normal, but it goes above normal at least 2 times. Where can you learn more? Go to http://don-peggy.info/. Enter H173 in the search box to learn more about \"High Blood Pressure: Care Instructions. \" Current as of: September 21, 2016 Content Version: 11.4 © 2821-8072 [employer]. Care instructions adapted under license by [employer] (which disclaims liability or warranty for this information). If you have questions about a medical condition or this instruction, always ask your healthcare professional. [address] any warranty or liability for your use of this information. Introducing [facility] & [facility]! Dear Donold Leyden: Thank you for requesting a [employer] account. Our records indicate that you already have an active [employer] account. You can access your account anytime at [email or web address] Did you know that you can access your hospital and ER discharge instructions at any time in [employer]? You can also review all of your test results from your hospital stay or ER visit. Additional Information If you have questions, please visit the Frequently Asked Questions section of the [employer] website at [email or web address] Remember, [employer] is NOT to be used for urgent needs. For medical emergencies, dial 911. Now available from your iPhone and Android! Please provide this summary of care documentation to your next provider. Your primary care clinician is listed as [facility]. If you have any questions after today's visit, please call [phone].

## 2018-06-07 NOTE — PATIENT INSTRUCTIONS
Diabetes Blood Sugar Emergencies: Your Action Plan  How can you prevent a blood sugar emergency? An important part of living with diabetes is keeping your blood sugar in your target range. You'll need to know what to do if it's too high or too low. Managing your blood sugar levels helps you avoid emergencies. This care sheet will teach you about the signs of high and low blood sugar. It will help you make an action plan with your doctor for when these signs occur. Low blood sugar is more likely to happen if you take certain medicines for diabetes. It can also happen if you skip a meal, drink alcohol, or exercise more than usual.  You may get high blood sugar if you eat differently than you normally do. One example is eating more carbohydrate than usual. Having a cold, the flu, or other sudden illness can also cause high blood sugar levels. Levels can also rise if you miss a dose of medicine. Any change in how you take your medicine may affect your blood sugar level. So it's important to work with your doctor before you make any changes. Check your blood sugar  Work with your doctor to fill in the blank spaces below that apply to you. Track your levels, know your target range, and write down ways you can get your blood sugar back in your target range. A log book can help you track your levels. Take the book to all of your medical appointments. · Check your blood sugar _____ times a day, at these times:________________________________________________. (For example: Before meals, at bedtime, before exercise, during exercise, other.)  · Your blood sugar target range before a meal is ___________________. Your blood sugar target range after a meal is _______________________. · Do jxet-___________________________________________________-hr get your blood sugar back within your safe range if your blood sugar results are _________________________________________.  (For example: Less than 70 or above 250 mg/dL.)  Call your doctor when your blood sugar results are ___________________________________. (For example: Less than 70 or above 250 mg/dL.)  What are the symptoms of low and high blood sugar? Common symptoms of low blood sugar are sweating and feeling shaky, weak, hungry, or confused. Symptoms can start quickly. Common symptoms of high blood sugar are feeling very thirsty or very hungry. You may also pass urine more often than usual. You may have blurry vision and may lose weight without trying. But some people may have high or low blood sugar without having any symptoms. That's a good reason to check your blood sugar on a regular schedule. What should you do if you have symptoms? Work with your doctor to fill in the blank spaces below that apply to you. Low blood sugar  If you have symptoms of low blood sugar, check your blood sugar. If it's below _____ ( for example, below 70), eat or drink a quick-sugar food that has about 15 grams of carbohydrate. Your goal is to get your level back to your safe range. Check your blood sugar again 15 minutes later. If it's still not in your target range, take another 15 grams of carbohydrate and check your blood sugar again in 15 minutes. Repeat this until you reach your target. Then go back to your regular testing schedule. When you have low blood sugar, it's best to stop or reduce any physical activity until your blood sugar is back in your target range and is stable. If you must stay active, eat or drink 30 grams of carbohydrate. Then check your blood sugar again in 15 minutes. If it's not in your target range, take another 30 grams of carbohydrates. Check your blood sugar again in 15 minutes. Keep doing this until you reach your target. You can then go back to your regular testing schedule. If your symptoms or blood sugar levels are getting worse or have not improved after 15 minutes, seek medical care right away.   Here are some examples of quick-sugar foods with 15 grams of carbohydrate:  · 3 or 4 glucose tablets  · 1 tube of glucose gel  · Hard candy (such as 3 Jolly Ranchers or 5 to 7 Life Savers)  · ½ cup to ¾ cup (4 to 6 ounces) of fruit juice or regular (not diet) soda  High blood sugar  If you have symptoms of high blood sugar, check your blood sugar. Your goal is to get your level back to your target range. If it's above ______ ( for example, above 250), follow these steps:  · If you missed a dose of your diabetes medicine, take it now. Take only the amount of medicine that you have been prescribed. Do not take more or less medicine. · Give yourself insulin if your doctor has prescribed it for high blood sugar. · Test for ketones, if the doctor told you to do so. If the results of the ketone test show a moderate-to-large amount of ketones, call the doctor for advice. · Wait 30 minutes after you take the extra insulin or the missed medicine. Check your blood sugar again. If your symptoms or blood sugar levels are getting worse or have not improved after taking these steps, seek medical care right away. Follow-up care is a key part of your treatment and safety. Be sure to make and go to all appointments, and call your doctor if you are having problems. It's also a good idea to know your test results and keep a list of the medicines you take. Where can you learn more? Go to http://don-peggy.info/. Enter Y763 in the search box to learn more about \"Diabetes Blood Sugar Emergencies: Your Action Plan. \"  Current as of: March 13, 2017  Content Version: 11.4  © 9676-0607 [employer]. Care instructions adapted under license by [employer] (which disclaims liability or warranty for this information). If you have questions about a medical condition or this instruction, always ask your healthcare professional. [place] [address] any warranty or liability for your use of this information.        High Blood Pressure: Care Instructions  Your Care Instructions    If your blood pressure is usually above 140/90, you have high blood pressure, or hypertension. That means the top number is 140 or higher or the bottom number is 90 or higher, or both. Despite what a lot of people think, high blood pressure usually doesn't cause headaches or make you feel dizzy or lightheaded. It usually has no symptoms. But it does increase your risk for heart attack, stroke, and kidney or eye damage. The higher your blood pressure, the more your risk increases. Your doctor will give you a goal for your blood pressure. Your goal will be based on your health and your age. An example of a goal is to keep your blood pressure below 140/90. Lifestyle changes, such as eating healthy and being active, are always important to help lower blood pressure. You might also take medicine to reach your blood pressure goal.  Follow-up care is a key part of your treatment and safety. Be sure to make and go to all appointments, and call your doctor if you are having problems. It's also a good idea to know your test results and keep a list of the medicines you take. How can you care for yourself at home? Medical treatment  · If you stop taking your medicine, your blood pressure will go back up. You may take one or more types of medicine to lower your blood pressure. Be safe with medicines. Take your medicine exactly as prescribed. Call your doctor if you think you are having a problem with your medicine. · Talk to your doctor before you start taking aspirin every day. Aspirin can help certain people lower their risk of a heart attack or stroke. But taking aspirin isn't right for everyone, because it can cause serious bleeding. · See your doctor regularly. You may need to see the doctor more often at first or until your blood pressure comes down.   · If you are taking blood pressure medicine, talk to your doctor before you take decongestants or anti-inflammatory medicine, such as ibuprofen. Some of these medicines can raise blood pressure. · Learn how to check your blood pressure at home. Lifestyle changes  · Stay at a healthy weight. This is especially important if you put on weight around the waist. Losing even 10 pounds can help you lower your blood pressure. · If your doctor recommends it, get more exercise. Walking is a good choice. Bit by bit, increase the amount you walk every day. Try for at least 30 minutes on most days of the week. You also may want to swim, bike, or do other activities. · Avoid or limit alcohol. Talk to your doctor about whether you can drink any alcohol. · Try to limit how much sodium you eat to less than 2,300 milligrams (mg) a day. Your doctor may ask you to try to eat less than 1,500 mg a day. · Eat plenty of fruits (such as bananas and oranges), vegetables, legumes, whole grains, and low-fat dairy products. · Lower the amount of saturated fat in your diet. Saturated fat is found in animal products such as milk, cheese, and meat. Limiting these foods may help you lose weight and also lower your risk for heart disease. · Do not smoke. Smoking increases your risk for heart attack and stroke. If you need help quitting, talk to your doctor about stop-smoking programs and medicines. These can increase your chances of quitting for good. When should you call for help? Call 911 anytime you think you may need emergency care. This may mean having symptoms that suggest that your blood pressure is causing a serious heart or blood vessel problem. Your blood pressure may be over 180/110. ? For example, call 911 if:  ? · You have symptoms of a heart attack. These may include:  ¨ Chest pain or pressure, or a strange feeling in the chest.  ¨ Sweating. ¨ Shortness of breath. ¨ Nausea or vomiting. ¨ Pain, pressure, or a strange feeling in the back, neck, jaw, or upper belly or in one or both shoulders or arms.   ¨ Lightheadedness or sudden weakness. ¨ A fast or irregular heartbeat. ? · You have symptoms of a stroke. These may include:  ¨ Sudden numbness, tingling, weakness, or loss of movement in your face, arm, or leg, especially on only one side of your body. ¨ Sudden vision changes. ¨ Sudden trouble speaking. ¨ Sudden confusion or trouble understanding simple statements. ¨ Sudden problems with walking or balance. ¨ A sudden, severe headache that is different from past headaches. ? · You have severe back or belly pain. ?Do not wait until your blood pressure comes down on its own. Get help right away. ?Call your doctor now or seek immediate care if:  ? · Your blood pressure is much higher than normal (such as 180/110 or higher), but you don't have symptoms. ? · You think high blood pressure is causing symptoms, such as:  ¨ Severe headache. ¨ Blurry vision. ? Watch closely for changes in your health, and be sure to contact your doctor if:  ? · Your blood pressure measures 140/90 or higher at least 2 times. That means the top number is 140 or higher or the bottom number is 90 or higher, or both. ? · You think you may be having side effects from your blood pressure medicine. ? · Your blood pressure is usually normal, but it goes above normal at least 2 times. Where can you learn more? Go to http://don-peggy.info/. Enter G948 in the search box to learn more about \"High Blood Pressure: Care Instructions. \"  Current as of: September 21, 2016  Content Version: 11.4  © 1372-2499 [employer]. Care instructions adapted under license by "[employer]" (which disclaims liability or warranty for this information). If you have questions about a medical condition or this instruction, always ask your healthcare professional. [place] [address] any warranty or liability for your use of this information.

## 2018-06-20 ENCOUNTER — OFFICE VISIT (OUTPATIENT)
Dept: INTERNAL MEDICINE CLINIC | Age: 68
End: 2018-06-20

## 2018-06-20 ENCOUNTER — HOSPITAL ENCOUNTER (OUTPATIENT)
Dept: LAB | Age: 68
Discharge: HOME | End: 2018-06-20
Payer: MEDICARE

## 2018-06-20 VITALS
HEART RATE: 72 BPM | HEIGHT: 68 IN | BODY MASS INDEX: 19.79 KG/M2 | WEIGHT: 130.6 LBS | SYSTOLIC BLOOD PRESSURE: 116 MMHG | RESPIRATION RATE: 20 BRPM | TEMPERATURE: 95.7 F | DIASTOLIC BLOOD PRESSURE: 64 MMHG | OXYGEN SATURATION: 100 %

## 2018-06-20 DIAGNOSIS — I10 ESSENTIAL HYPERTENSION: Chronic | ICD-10-CM

## 2018-06-20 DIAGNOSIS — M79.605 PAIN OF LEFT LOWER EXTREMITY: ICD-10-CM

## 2018-06-20 DIAGNOSIS — E11.40 TYPE 2 DIABETES MELLITUS WITH DIABETIC NEUROPATHY, WITHOUT LONG-TERM CURRENT USE OF INSULIN (HCC): Chronic | ICD-10-CM

## 2018-06-20 DIAGNOSIS — E78.00 HYPERCHOLESTEROLEMIA: Chronic | ICD-10-CM

## 2018-06-20 DIAGNOSIS — M79.2 NEUROPATHIC PAIN: ICD-10-CM

## 2018-06-20 DIAGNOSIS — Z00.00 MEDICARE ANNUAL WELLNESS VISIT, SUBSEQUENT: Primary | ICD-10-CM

## 2018-06-20 PROBLEM — E11.21 TYPE 2 DIABETES WITH NEPHROPATHY (HCC): Status: RESOLVED | Noted: 2018-05-09 | Resolved: 2018-06-20

## 2018-06-20 LAB
ANION GAP SERPL CALC-SCNC: 9 MMOL/L (ref 3–18)
BUN SERPL-MCNC: 22 MG/DL (ref 7–18)
BUN/CREAT SERPL: 19 (ref 12–20)
CALCIUM SERPL-MCNC: 9.9 MG/DL (ref 8.5–10.1)
CHLORIDE SERPL-SCNC: 105 MMOL/L (ref 100–108)
CHOLEST SERPL-MCNC: 254 MG/DL
CO2 SERPL-SCNC: 26 MMOL/L (ref 21–32)
CREAT SERPL-MCNC: 1.14 MG/DL (ref 0.6–1.3)
GLUCOSE SERPL-MCNC: 101 MG/DL (ref 74–99)
HBA1C MFR BLD: 5.5 % (ref 4.2–5.6)
HDLC SERPL-MCNC: 63 MG/DL (ref 40–60)
HDLC SERPL: 4 {RATIO} (ref 0–5)
LDLC SERPL CALC-MCNC: 169.6 MG/DL (ref 0–100)
LIPID PROFILE,FLP: ABNORMAL
POTASSIUM SERPL-SCNC: 3.8 MMOL/L (ref 3.5–5.5)
SODIUM SERPL-SCNC: 140 MMOL/L (ref 136–145)
TRIGL SERPL-MCNC: 107 MG/DL (ref ?–150)
VLDLC SERPL CALC-MCNC: 21.4 MG/DL

## 2018-06-20 PROCEDURE — 80061 LIPID PANEL: CPT | Performed by: INTERNAL MEDICINE

## 2018-06-20 PROCEDURE — 83036 HEMOGLOBIN GLYCOSYLATED A1C: CPT | Performed by: INTERNAL MEDICINE

## 2018-06-20 PROCEDURE — 80048 BASIC METABOLIC PNL TOTAL CA: CPT | Performed by: INTERNAL MEDICINE

## 2018-06-20 RX ORDER — PREGABALIN 150 MG/1
150 CAPSULE ORAL 2 TIMES DAILY
Qty: 60 CAP | Refills: 5 | Status: SHIPPED | OUTPATIENT
Start: 2018-06-20 | End: 2019-01-08 | Stop reason: SDUPTHER

## 2018-06-20 RX ORDER — OXYCODONE HYDROCHLORIDE 10 MG/1
10 TABLET ORAL
Qty: 60 TAB | Refills: 0 | Status: SHIPPED | OUTPATIENT
Start: 2018-06-20 | End: 2018-07-26 | Stop reason: SDUPTHER

## 2018-06-20 RX ORDER — DULOXETIN HYDROCHLORIDE 30 MG/1
30 CAPSULE, DELAYED RELEASE ORAL DAILY
Qty: 30 CAP | Refills: 5 | Status: SHIPPED | OUTPATIENT
Start: 2018-06-20 | End: 2018-07-26 | Stop reason: SDUPTHER

## 2018-06-20 NOTE — PATIENT INSTRUCTIONS
Medicare Wellness Visit, Male    The best way to live healthy is to have a lifestyle where you eat a well-balanced diet, exercise regularly, limit alcohol use, and quit all forms of tobacco/nicotine, if applicable. Regular preventive services are another way to keep healthy. Preventive services (vaccines, screening tests, monitoring & exams) can help personalize your care plan, which helps you manage your own care. Screening tests can find health problems at the earliest stages, when they are easiest to treat. [facility] follows the current, evidence-based guidelines published by the [facility] ([facility]) when recommending preventive services for our patients. Because we follow these guidelines, sometimes recommendations change over time as research supports it. (For example, a prostate screening blood test is no longer routinely recommended for men with no symptoms.)    Of course, you and your provider may decide to screen more often for some diseases, based on your risk and co-morbidities (chronic disease you are already diagnosed with). Preventive services for you include:    - Medicare offers their members a free annual wellness visit, which is time for you and your primary care provider to discuss and plan for your preventive service needs. Take advantage of this benefit every year!    -All people over age 72 should receive the recommended pneumonia vaccines. Current USPSTF guidelines recommend a series of two vaccines for the best pneumonia protection.     -All adults should have a yearly flu vaccine and a tetanus vaccine every 10 years.  All adults age 61 years should receive a shingles vaccine once in their lifetime.      -All adults age 38-68 years who are overweight should have a diabetes screening test once every three years.     -Other screening tests & preventive services for persons with diabetes include: an eye exam to screen for diabetic retinopathy, a kidney function test, a foot exam, and stricter control over your cholesterol.     -Cardiovascular screening for adults with routine risk involves an electrocardiogram (ECG) at intervals determined by the provider.     -Colorectal cancer screenings should be done for adults age 54-65 years with normal risk. There are a number of acceptable methods of screening for this type of cancer. Each test has its own benefits and drawbacks. Discuss with your provider what is most appropriate for you during your annual wellness visit. The different tests include: colonoscopy (considered the best screening method), a fecal occult blood test, a fecal DNA test, and sigmoidoscopy.    -All adults born between [facility] should be screened once for Hepatitis C.    -An Abdominal Aortic Aneurysm (AAA) Screening is recommended for men age 73-68 who has ever smoked in their lifetime.      Here is a list of your current Health Maintenance items (your personalized list of preventive services) with a due date:  Health Maintenance Due   Topic Date Due   [facility] Exam  03/03/1960    Annual Well Visit  03/14/2018    Hemoglobin A1C    07/09/2018

## 2018-06-20 NOTE — MR AVS SNAPSHOT
[facility] 
 
 
 [address] [facility] [address] [address] 
[phone] Patient: Danielle Alanis MRN: [id number] SNF:[id number] Visit Information Date & Time Provider Department Dept. Phone Encounter #  
 6/20/2018  2:00 PM Mirian Mauricio, [facility] [phone] [id number] Follow-up Instructions Return in about 1 month (around 7/20/2018) for leg pain , check on med changes. Your Appointments 7/5/2018  9:00 AM  
ESTABLISHED PATIENT with Luís Conrad MD  
[facility] [place] [address] ([facility]) Appt Note: 4 week follow up for [facility] for possible jj removal  
 [address] [place] [address]  
[phone]  
  
   
 [place] [id number] Upcoming Health Maintenance Date Due  
 EYE EXAM RETINAL OR DILATED Q1 3/3/1960 MEDICARE YEARLY EXAM 3/14/2018 HEMOGLOBIN A1C Q6M 7/9/2018 DTaP/Tdap/Td series (1 - Tdap) 3/20/2019* GLAUCOMA SCREENING Q2Y 4/28/2020* Influenza Age 5 to Adult 8/1/2018 Pneumococcal 65+ Low/Medium Risk (2 of 2 - PCV13) 9/1/2018 MICROALBUMIN Q1 11/10/2018 LIPID PANEL Q1 11/10/2018 COLONOSCOPY 1/18/2020 *Topic was postponed. The date shown is not the original due date. Allergies as of 6/20/2018  Review Complete On: 6/20/2018 By: Mirian Mauricio MD  
 No Known Allergies Current Immunizations  Reviewed on 2/6/2018 Name Date Influenza Vaccine 9/1/2017 12:00 AM, 9/12/2016, 10/1/2013 12:00 AM  
 Influenza Vaccine (Quad) 10/3/2015 Influenza Vaccine PF 12/26/2014  2:06 PM  
 Pneumococcal Polysaccharide (PPSV-23) 9/1/2017 12:00 AM, 7/7/2013 12:00 AM  
 TB Skin Test (PPD) Intradermal 3/11/2009 12:00 AM  
  
 Not reviewed this visit You Were Diagnosed With   
  
 Codes Comments Medicare annual wellness visit, subsequent    -  Primary ICD-10-CM: Z00.00 ICD-9-CM: V70.0 Neuropathic pain     ICD-10-CM: M79.2 ICD-9-CM: 729.2 Pain of left lower extremity     ICD-10-CM: M79.605 ICD-9-CM: 729.5 Essential hypertension     ICD-10-CM: I10 
ICD-9-CM: 401.9 Hypercholesterolemia     ICD-10-CM: E78.00 ICD-9-CM: 272.0 Type 2 diabetes mellitus with diabetic neuropathy, without long-term current use of insulin (HCC)     ICD-10-CM: E11.40 ICD-9-CM: 250.60, 357.2 Vitals BP Pulse Temp Resp Height(growth percentile) Weight(growth percentile) 116/64 (BP 1 Location: Right arm, BP Patient Position: Sitting) 72 95.7 °F (35.4 °C) (Oral) 20 5' 8\" (1.727 m) 130 lb 9.6 oz (59.2 kg) SpO2 BMI Smoking Status 100% 19.86 kg/m2 Former Smoker Vitals History BMI and BSA Data Body Mass Index Body Surface Area  
 19.86 kg/m 2 1.69 m 2 Preferred Pharmacy Pharmacy Name Phone [address] [place] [address], [address] [place] [address] [phone] Your Updated Medication List  
  
   
This list is accurate as of 6/20/18  2:56 PM.  Always use your most recent med list.  
  
  
  
  
 albuterol 90 mcg/actuation inhaler Commonly known as:  PROVENTIL HFA, VENTOLIN HFA, PROAIR HFA Take 1 Puff by inhalation every six (6) hours as needed for Wheezing. aspirin 81 mg chewable tablet 81 mg. DULoxetine 30 mg capsule Commonly known as:  CYMBALTA Take 1 Cap by mouth daily. Indications: NEUROPATHIC PAIN  
  
 famotidine 20 mg tablet Commonly known as:  PEPCID  
  
 losartan-hydroCHLOROthiazide 100-25 mg per tablet Commonly known as:  HYZAAR Take 1 Tab by mouth daily for 30 days. meloxicam 15 mg tablet Commonly known as:  MOBIC Take 1 Tab by mouth daily as needed for Pain.  
  
 metFORMIN 500 mg tablet Commonly known as:  GLUCOPHAGE Take 1 Tab by mouth two (2) times daily (with meals). Indications: type 2 diabetes mellitus  
  
 metoprolol succinate 50 mg XL tablet Commonly known as:  TOPROL-XL Take 1 Tab by mouth daily. Indications: hypertension  
  
 nitrofurantoin 50 mg capsule Commonly known as:  MACRODANTIN Take 1 Cap by mouth two (2) times a day. nortriptyline 50 mg capsule Commonly known as:  PAMELOR Take 50 mg by mouth daily. oxyCODONE IR 10 mg Tab immediate release tablet Commonly known as:  Ladarius Melena Take 1 Tab by mouth every four (4) hours as needed. Max Daily Amount: 60 mg. Indications: Pain  
  
 pregabalin 150 mg capsule Commonly known as:  Nan Da Take 1 Cap by mouth two (2) times a day. Max Daily Amount: 300 mg. Indications: Diabetic Peripheral Neuropathy SIMVASTATIN PO Take 1 Tab by Mouth Once a Day. TEFLARO injection Generic drug:  cefTARoline  
  
 therapeutic multivitamin-minerals tablet Commonly known as:  THERAGRAN-M Take 1 Tab by Mouth Once a Day. thiamine 100 mg tablet Commonly known as:  B-1  
100 mg.  
  
 trospium 20 mg tablet Commonly known as:  Maki [place] Take 20 mg by mouth two (2) times daily as needed. Prescriptions Printed Refills  
 pregabalin (LYRICA) 150 mg capsule 5 Sig: Take 1 Cap by mouth two (2) times a day. Max Daily Amount: 300 mg. Indications: Diabetic Peripheral Neuropathy Class: Print Route: Oral  
 oxyCODONE IR (ROXICODONE) 10 mg tab immediate release tablet 0 Sig: Take 1 Tab by mouth every four (4) hours as needed. Max Daily Amount: 60 mg. Indications: Pain Class: Print Route: Oral  
  
Prescriptions Sent to Pharmacy Refills DULoxetine (CYMBALTA) 30 mg capsule 5 Sig: Take 1 Cap by mouth daily. Indications: NEUROPATHIC PAIN Class: Normal  
 Pharmacy: [address] [place] [address] #: [phone] Route: Oral  
  
Follow-up Instructions Return in about 1 month (around 7/20/2018) for leg pain , check on med changes. To-Do List   
 06/20/2018 Lab:  HEMOGLOBIN A1C W/O EAG   
  
 06/20/2018 Lab:  LIPID PANEL   
  
 06/20/2018 Lab:  METABOLIC PANEL, BASIC Patient Instructions Medicare Wellness Visit, Male The best way to live healthy is to have a lifestyle where you eat a well-balanced diet, exercise regularly, limit alcohol use, and quit all forms of tobacco/nicotine, if applicable. Regular preventive services are another way to keep healthy. Preventive services (vaccines, screening tests, monitoring & exams) can help personalize your care plan, which helps you manage your own care. Screening tests can find health problems at the earliest stages, when they are easiest to treat. [facility] follows the current, evidence-based guidelines published by the [facility] Eric Lazcano ([facility]) when recommending preventive services for our patients. Because we follow these guidelines, sometimes recommendations change over time as research supports it. (For example, a prostate screening blood test is no longer routinely recommended for men with no symptoms.) Of course, you and your provider may decide to screen more often for some diseases, based on your risk and co-morbidities (chronic disease you are already diagnosed with). Preventive services for you include: - Medicare offers their members a free annual wellness visit, which is time for you and your primary care provider to discuss and plan for your preventive service needs. Take advantage of this benefit every year! 
 
-All people over age 72 should receive the recommended pneumonia vaccines. Current USPSTF guidelines recommend a series of two vaccines for the best pneumonia protection.  
 
-All adults should have a yearly flu vaccine and a tetanus vaccine every 10 years.  All adults age 61 years should receive a shingles vaccine once in their lifetime.   
 
-All adults age 38-68 years who are overweight should have a diabetes screening test once every three years.  
 
-Other screening tests & preventive services for persons with diabetes include: an eye exam to screen for diabetic retinopathy, a kidney function test, a foot exam, and stricter control over your cholesterol.  
 
-Cardiovascular screening for adults with routine risk involves an electrocardiogram (ECG) at intervals determined by the provider.  
 
-Colorectal cancer screenings should be done for adults age 54-65 years with normal risk. There are a number of acceptable methods of screening for this type of cancer. Each test has its own benefits and drawbacks. Discuss with your provider what is most appropriate for you during your annual wellness visit. The different tests include: colonoscopy (considered the best screening method), a fecal occult blood test, a fecal DNA test, and sigmoidoscopy. 
 
-All adults born between [facility] should be screened once for Hepatitis C. 
 
-An Abdominal Aortic Aneurysm (AAA) Screening is recommended for men age 73-68 who has ever smoked in their lifetime. Here is a list of your current Health Maintenance items (your personalized list of preventive services) with a due date: 
Health Maintenance Due Topic Date Due [facility] Eye Exam  03/03/1960 [facility] Annual Well Visit  03/14/2018  Hemoglobin A1C    07/09/2018 Introducing [facility]! Dear Carlos Cisse: Thank you for requesting a [employer] account. Our records indicate that you already have an active [employer] account. You can access your account anytime at [email or web address] Did you know that you can access your hospital and ER discharge instructions at any time in [employer]? You can also review all of your test results from your hospital stay or ER visit. Additional Information If you have questions, please visit the Frequently Asked Questions section of the [employer] website at [email or web address] Remember, MyChart is NOT to be used for urgent needs. For medical emergencies, dial 911. Now available from your iPhone and Android! Please provide this summary of care documentation to your next provider. Your primary care clinician is listed as [facility]. If you have any questions after today's visit, please call [phone].

## 2018-06-20 NOTE — ACP (ADVANCE CARE PLANNING)
Advance Care Planning (ACP) Provider Conversation Snapshot    Date of ACP Conversation: 06/20/18  Persons included in Conversation:  patient  Length of ACP Conversation in minutes:  <16 minutes (Non-Billable)    Authorized Decision Maker (if patient is incapable of making informed decisions): This person is: Other Legally Authorized Decision Maker (e.g. Next of Kin)          For Patients with Decision Making Capacity:   Values/Goals: Exploration of values, goals, and preferences if recovery is not expected, even with continued medical treatment in the event of:  Imminent death  Severe, permanent brain injury  \"In these circumstances, what matters most to you? \"  Care focused more on comfort or quality of life.     Conversation Outcomes / Follow-Up Plan:   Recommended completion of Advance Directive  after  conversation with prospective healthcare agent

## 2018-06-20 NOTE — PROGRESS NOTES
ROOM # 6   Pt states just had stents placed in ureters. Marie Polanco presents today for   Chief Complaint   Patient presents with    Hypertension    Diabetes    Cholesterol Problem       Marie Polanco preferred language for health care discussion is english/other. Is someone accompanying this pt? no    Is the patient using any DME equipment during [address]? Yes, walker    Depression Screening:  PHQ over the last two weeks 5/30/2018 11/10/2017 6/22/2017 2/6/2017 7/11/2016 5/12/2015 7/10/2014   Little interest or pleasure in doing things Several days More than half the days Not at all Not at all Not at all Not at all Not at all   Feeling down, depressed or hopeless Several days More than half the days Not at all Not at all Not at all Not at all Not at all   Total Score PHQ 2 2 4 0 0 0 0 0       Learning Assessment:  Learning Assessment 7/10/2014   PRIMARY LEARNER Patient   HIGHEST LEVEL OF EDUCATION - PRIMARY LEARNER  GRADUATED HIGH SCHOOL OR GED   BARRIERS PRIMARY LEARNER NONE   CO-LEARNER CAREGIVER No   PRIMARY LANGUAGE ENGLISH   LEARNER PREFERENCE PRIMARY DEMONSTRATION   ANSWERED BY self   RELATIONSHIP SELF       Abuse Screening:  Abuse Screening Questionnaire 5/30/2018 11/10/2017 5/12/2015   Do you ever feel afraid of your partner? N N N   Are you in a relationship with someone who physically or mentally threatens you? N N N   Is it safe for you to go home? Jennifer Poon       Fall Risk  Fall Risk Assessment, last 12 mths 6/20/2018 5/30/2018 11/10/2017 6/22/2017 2/6/2017 7/11/2016   Able to walk? Yes Yes Yes Yes Yes Yes   Fall in past 12 months? No No No No No No       Health Maintenance reviewed and discussed per provider. Yes    Marie Polanco is due for   Health Maintenance Due   Topic Date Due    EYE EXAM RETINAL OR DILATED Q1  03/03/1960    MEDICARE YEARLY EXAM  03/14/2018    HEMOGLOBIN A1C Q6M  07/09/2018     Please order/place referral if appropriate. Advance Directive:  1.  Do you have an advance directive in place? Patient Reply: no    2. If not, would you like material regarding how to put one in place? Patient Reply: no    Coordination of Care:  1. Have you been to the ER, urgent care clinic since your last visit? Hospitalized since your last visit? Yes, for blood in urine    2. Have you seen or consulted any other health care providers outside of the [address] [place] [address] since your last visit? Include any pap smears or colon screening.  yes

## 2018-06-20 NOTE — PROGRESS NOTES
HISTORY OF PRESENT ILLNESS  David Archuleta is a 80 y.o. male. Visit Vitals    /64 (BP 1 Location: Right arm, BP Patient Position: Sitting)    Pulse 72    Temp 95.7 °F (35.4 °C) (Oral)    Resp 20    Ht 5' 8\" (1.727 m)    Wt 130 lb 9.6 oz (59.2 kg)    SpO2 100%    BMI 19.86 kg/m2       HPI Comments: Has been in the hospital to have additional evaluation of his left leg. He will f/u with Dr. Ada Cortez next week for recheck left foot    He also saw Dr. Andriy David on 6/15 and had a ureteral stent      He reports the neuropathy is particularly bad. It hurts to touch is left lowr leg  He is on lyrica BID and pain meds    Hypertension    The history is provided by the patient. This is a chronic problem. The current episode started more than 1 week ago. The problem has not changed since onset. Risk factors include a sedentary lifestyle, male gender, hypertension, stress, dyslipidemia and diabetes mellitus. Diabetes   The history is provided by the patient. This is a chronic problem. The current episode started more than 1 week ago. The problem occurs daily. The problem has not changed since onset. Cholesterol Problem         Review of Systems   Musculoskeletal:        Chronic severe left leg pain       Physical Exam   Constitutional: He appears well-developed and well-nourished. No distress. Musculoskeletal:   LLE is slighltly cool but his hands are as well. Skin: He is not diaphoretic. Nursing note and vitals reviewed. ASSESSMENT and PLAN    ICD-10-CM ICD-9-CM           2. Neuropathic pain M79.2 729.2 oxyCODONE IR (ROXICODONE) 10 mg tab immediate release tablet      DULoxetine (CYMBALTA) 30 mg capsule   3. Pain of left lower extremity M79.605 729.5 pregabalin (LYRICA) 150 mg capsule      DULoxetine (CYMBALTA) 30 mg capsule   4. Essential hypertension P69 316.7 METABOLIC PANEL, BASIC   5. Hypercholesterolemia E78.00 272.0 LIPID PANEL   6.  Type 2 diabetes mellitus with diabetic neuropathy, without long-term current use of insulin (HCC) E11.40 250.60 HEMOGLOBIN A1C W/O EAG     357.2        Pt had diabetic neuropathy and vascular disease with extensive surgery in his left leg. Will add cymbalta for hte neuropathy    Update lab    F/u one month to assess Cymbalta              The following is a separate encounter note: This is the Subsequent Medicare Annual Wellness Exam, performed 12 months or more after the Initial AWV or the last Subsequent AWV    I have reviewed the patient's medical history in detail and updated the computerized patient record.      History     Past Medical History:   Diagnosis Date    Acute occlusion of artery of lower extremity due to thromboembolism ([facility] 03/27/2018    LLE    Allergic rhinitis due to other allergen     Arthritis     multiple joints    Bile-induced gastritis 01/16/2017    by EGD    Bundle branch block, unspecified 12/28/09    incomplete right    Chronic airway obstruction, not elsewhere classified 6/24/09    Colon polyps 9/25/14    tubular adenoma    Diabetes ([facility] 2007    due to steroid for breathing/COPD    Hiatal hernia 01/16/2017    by EGD    Hypercholesterolemia 2-3 yrs    Hypertension 2007    Osteopenia     Peripheral neuropathy     RA (rheumatoid arthritis) ([facility] 2005    Dr. Marte Immanuel cuff tear     bilat    Stroke [facility]) June 2014    TIA    Stroke [facility]) 05/2018    TIA    Tonsillar enlargement 5/14    palatine    Torn meniscus     right, lateral      Past Surgical History:   Procedure Laterality Date    HX ANGIOPLASTY Left 12/08/2017    LLE, occluded popliteal,     HX COLONOSCOPY  9/25/14    Dr. Molly Byers HX COLONOSCOPY  01/18/2017    in hospital    HX ENDOSCOPY  01/16/2017    EGD, Dr. Kylah Raphael HX ENDOSCOPY  01/18/2017    at [id number] [place] [address]  2010    bilaterally , Dr. Maricarmen Beltran  2006    bronchoscopy    HX OTHER SURGICAL  06/2018    Stent placed in ureters    HX SHOULDER REPLACEMENT  2012 and 2011    bilaterally    HX UROLOGICAL  04/02/2018    CYSTOURETHROSCOPY WITH INDWELLING URETERAL STENT INSERTION; RETROGRADE; Surgeon: Leslie Joseph MD     Current Outpatient Prescriptions   Medication Sig Dispense Refill    pregabalin (LYRICA) 150 mg capsule Take 1 Cap by mouth two (2) times a day. Max Daily Amount: 300 mg. Indications: Diabetic Peripheral Neuropathy 60 Cap 5    oxyCODONE IR (ROXICODONE) 10 mg tab immediate release tablet Take 1 Tab by mouth every four (4) hours as needed. Max Daily Amount: 60 mg. Indications: Pain 60 Tab 0    DULoxetine (CYMBALTA) 30 mg capsule Take 1 Cap by mouth daily. Indications: NEUROPATHIC PAIN 30 Cap 5    losartan-hydroCHLOROthiazide (HYZAAR) 100-25 mg per tablet Take 1 Tab by mouth daily for 30 days. 30 Tab 0    nortriptyline (PAMELOR) 50 mg capsule Take 50 mg by mouth daily.  SIMVASTATIN PO Take 1 Tab by Mouth Once a Day.  metFORMIN (GLUCOPHAGE) 500 mg tablet Take 1 Tab by mouth two (2) times daily (with meals). Indications: type 2 diabetes mellitus 60 Tab 5    metoprolol succinate (TOPROL-XL) 50 mg XL tablet Take 1 Tab by mouth daily. Indications: hypertension 30 Tab 5    albuterol (PROVENTIL HFA, VENTOLIN HFA, PROAIR HFA) 90 mcg/actuation inhaler Take 1 Puff by inhalation every six (6) hours as needed for Wheezing. 1 Inhaler 5    therapeutic multivitamin-minerals (THERAGRAN-M) tablet Take 1 Tab by Mouth Once a Day.  aspirin 81 mg chewable tablet 81 mg.      nitrofurantoin (MACRODANTIN) 50 mg capsule Take 1 Cap by mouth two (2) times a day. 14 Cap 0    TEFLARO injection       famotidine (PEPCID) 20 mg tablet       meloxicam (MOBIC) 15 mg tablet Take 1 Tab by mouth daily as needed for Pain. 90 Tab 1    trospium (SANCTURA) 20 mg tablet Take 20 mg by mouth two (2) times daily as needed.  thiamine (B-1) 100 mg tablet 100 mg.        No Known Allergies  Family History   Problem Relation Age of Onset    Asthma Mother    Nay Lam Elevated Lipids Mother     Heart Disease Mother     Hypertension Mother     Alcohol abuse Father     Arthritis-osteo Father     Cancer Father      Social History   Substance Use Topics    Smoking status: Former Smoker     Packs/day: 1.00     Years: 40.00     Types: Cigarettes     Quit date: 7/10/2005    Smokeless tobacco: Never Used      Comment: quit 2005    Alcohol use Yes      Comment: seldom     Patient Active Problem List   Diagnosis Code    Hypercholesterolemia E78.00    Type 2 diabetes mellitus with diabetic neuropathy ([facility] E11.40    Essential hypertension I10    Pain of left lower extremity M79.605    [facility] E11.21    Pre-op evaluation Z01.818       Depression Risk Factor Screening:     PHQ over the last two weeks 5/30/2018   Little interest or pleasure in doing things Several days   Feeling down, depressed or hopeless Several days   Total Score PHQ 2 2     Alcohol Risk Factor Screening: You do not drink alcohol or very rarely. On any occasion in the past three months you have had more than 7 drinks containing alcohol    Functional Ability and Level of Safety:   Hearing Loss  Hearing is good. Activities of Daily Living  The home contains: no safety equipment. Patient needs help with:  transportation and walking    Fall Risk  Fall Risk Assessment, last 12 mths 6/20/2018   Able to walk? Yes   Fall in past 12 months?  No       Abuse Screen  Patient is not abused    Cognitive Screening   Evaluation of Cognitive Function:  Has your family/caregiver stated any concerns about your memory: no  Normal, Mini Cog test  2/3 3rd with cue    Patient Care Team   Patient Care Team:  Anatoliy Navas MD as PCP - General (Internal Medicine)  Xavi Cisse MD (Inactive) as Consulting Provider (Ophthalmology)  Conner Grace DO as Consulting Provider (Rheumatology)  Dr. Isabelle Moran as Consulting Provider  Larry Archuleta MD as Consulting Provider (Orthopedic Surgery)  Luan Dahl MD as Consulting Provider (Gastroenterology)  Raven Gamboa RN as Nurse Navigator  Rosalinda Kate MD as Consulting Provider (Infectious Diseases)  Maddi Marie NP as Consulting Provider (Nurse Practitioner)    Assessment/Plan   Education and counseling provided:  Are appropriate based on today's review and evaluation    Diagnoses and all orders for this visit:    1.  Medicare annual wellness visit, subsequent          Health Maintenance Due   Topic Date Due    EYE EXAM RETINAL OR DILATED Q1  03/03/1960    MEDICARE YEARLY EXAM  03/14/2018    HEMOGLOBIN A1C Q6M  07/09/2018       Pt instructed to call eye doctor for f/u

## 2018-06-22 RX ORDER — METFORMIN HYDROCHLORIDE 500 MG/1
500 TABLET ORAL
Qty: 60 TAB | Refills: 5
Start: 2018-06-22 | End: 2018-07-26 | Stop reason: SDUPTHER

## 2018-07-26 ENCOUNTER — OFFICE VISIT (OUTPATIENT)
Dept: INTERNAL MEDICINE CLINIC | Age: 68
End: 2018-07-26

## 2018-07-26 ENCOUNTER — HOSPITAL ENCOUNTER (OUTPATIENT)
Dept: LAB | Age: 68
Discharge: HOME | End: 2018-07-26
Payer: MEDICARE

## 2018-07-26 VITALS
OXYGEN SATURATION: 98 % | SYSTOLIC BLOOD PRESSURE: 139 MMHG | TEMPERATURE: 97.6 F | RESPIRATION RATE: 20 BRPM | HEIGHT: 68 IN | WEIGHT: 145.4 LBS | DIASTOLIC BLOOD PRESSURE: 77 MMHG | HEART RATE: 56 BPM | BODY MASS INDEX: 22.04 KG/M2

## 2018-07-26 DIAGNOSIS — Z79.891 LONG TERM (CURRENT) USE OF OPIATE ANALGESIC: ICD-10-CM

## 2018-07-26 DIAGNOSIS — M79.2 NEUROPATHIC PAIN: Primary | ICD-10-CM

## 2018-07-26 DIAGNOSIS — M79.605 PAIN OF LEFT LOWER EXTREMITY: ICD-10-CM

## 2018-07-26 DIAGNOSIS — I73.9 PAD (PERIPHERAL ARTERY DISEASE) (HCC): Chronic | ICD-10-CM

## 2018-07-26 DIAGNOSIS — I10 ESSENTIAL HYPERTENSION: Chronic | ICD-10-CM

## 2018-07-26 PROBLEM — E11.40 TYPE 2 DIABETES MELLITUS WITH DIABETIC NEUROPATHY (HCC): Chronic | Status: ACTIVE | Noted: 2018-01-09

## 2018-07-26 PROCEDURE — G0480 DRUG TEST DEF 1-7 CLASSES: HCPCS | Performed by: INTERNAL MEDICINE

## 2018-07-26 RX ORDER — LOSARTAN POTASSIUM AND HYDROCHLOROTHIAZIDE 25; 100 MG/1; MG/1
1 TABLET ORAL DAILY
Qty: 90 TAB | Refills: 4 | Status: SHIPPED | OUTPATIENT
Start: 2018-07-26 | End: 2018-11-27

## 2018-07-26 RX ORDER — NORTRIPTYLINE HYDROCHLORIDE 50 MG/1
50 CAPSULE ORAL
Qty: 90 CAP | Refills: 4 | Status: SHIPPED | OUTPATIENT
Start: 2018-07-26 | End: 2018-09-06

## 2018-07-26 RX ORDER — CLOPIDOGREL BISULFATE 75 MG/1
75 TABLET ORAL DAILY
Qty: 90 TAB | Refills: 4 | Status: SHIPPED | OUTPATIENT
Start: 2018-07-26 | End: 2018-10-18

## 2018-07-26 RX ORDER — METFORMIN HYDROCHLORIDE 500 MG/1
500 TABLET ORAL
Qty: 180 TAB | Refills: 4 | Status: SHIPPED | OUTPATIENT
Start: 2018-07-26 | End: 2019-04-18 | Stop reason: SDUPTHER

## 2018-07-26 RX ORDER — METOPROLOL SUCCINATE 50 MG/1
50 TABLET, EXTENDED RELEASE ORAL DAILY
Qty: 90 TAB | Refills: 4 | Status: SHIPPED | OUTPATIENT
Start: 2018-07-26 | End: 2018-09-06

## 2018-07-26 RX ORDER — DULOXETIN HYDROCHLORIDE 30 MG/1
30 CAPSULE, DELAYED RELEASE ORAL DAILY
Qty: 30 CAP | Refills: 5 | Status: SHIPPED | OUTPATIENT
Start: 2018-07-26 | End: 2018-09-06 | Stop reason: SDUPTHER

## 2018-07-26 RX ORDER — CLOPIDOGREL BISULFATE 75 MG/1
75 TABLET ORAL DAILY
COMMUNITY
End: 2018-07-26 | Stop reason: SDUPTHER

## 2018-07-26 RX ORDER — OXYCODONE HYDROCHLORIDE 10 MG/1
10 TABLET ORAL
Qty: 60 TAB | Refills: 0 | Status: SHIPPED | OUTPATIENT
Start: 2018-07-26 | End: 2018-09-06 | Stop reason: SDUPTHER

## 2018-07-26 NOTE — PROGRESS NOTES
HISTORY OF PRESENT ILLNESS  Daiana Salcedo is a 80 y.o. male. Visit Vitals    /77 (BP 1 Location: Right arm, BP Patient Position: Sitting)    Pulse (!) 56    Temp 97.6 °F (36.4 °C) (Oral)    Resp 20    Ht 5' 8\" (1.727 m)    Wt 145 lb 6.4 oz (66 kg)    SpO2 98%    BMI 22.11 kg/m2       HPI Comments: To have upcoming more vascular evaluation inc scan of abdomen--I suspect to check aorta    He is also continuing to have hematuria. He already had a left stent and cystoscopy. They have not found anything serious. But he is to have re-do    Leg Pain    The history is provided by the patient (since last procedure, left still hurts as much as it did. Neuropathy is the main issue, not just a vascular issue. ). This is a chronic problem. The current episode started more than 1 week ago. The problem occurs daily. The problem has not changed since onset. Associated symptoms include tingling. Hypertension    The history is provided by the patient. This is a chronic problem. The current episode started more than 1 week ago. The problem has not changed since onset. Pertinent negatives include no blurred vision. There are no associated agents to hypertension. Risk factors include male gender, hypertension and stress. Neurologic Problem   The history is provided by the patient (peripheral neuropathy). This is a chronic problem. The current episode started more than 1 week ago. The problem has not changed since onset. Pertinent negatives include no focal weakness. Associated symptoms comments: None  . Review of Systems   Constitutional: Negative for chills and fever. Eyes: Negative for blurred vision and double vision. Musculoskeletal: Positive for joint pain. Neurological: Positive for tingling and sensory change. Negative for focal weakness. Physical Exam   Constitutional: He is oriented to person, place, and time. He appears well-developed and well-nourished. No distress.    Cardiovascular: Normal rate.    Pulmonary/Chest: Effort normal.   Abdominal: Soft. He exhibits no distension. There is no tenderness. There is no rebound. Musculoskeletal: He exhibits no edema. Legs are warm   Neurological: He is alert and oriented to person, place, and time. Skin: Skin is warm and dry. He is not diaphoretic. Psychiatric: He has a normal mood and affect. Nursing note and vitals reviewed. ASSESSMENT and PLAN    ICD-10-CM ICD-9-CM    1. Neuropathic pain M79.2 729.2 oxyCODONE IR (ROXICODONE) 10 mg tab immediate release tablet   2. Pain of left lower extremity M79.605 729.5    3. Essential hypertension I10 401.9 metoprolol succinate (TOPROL-XL) 50 mg XL tablet   4. PAD (peripheral artery disease) ([facility]) I73.9 443.9    5. Long term (current) use of opiate analgesic Z79.891 V58.69 TOXASSURE SELECT 13 (MW)       Pt still with rather significant pain  Has known neuropathy AND vascular disease    Needs UDS today   reviewed and appropriate activity noted. No adverse activity noted. States his BID percocet is the only thing that helps his pain. Sometimes, if not hurting, he will skip them.     Pt states he never got the cymbalta--will refill    F/u 6 weeks for recheck

## 2018-07-26 NOTE — PROGRESS NOTES
ROOM # 5    Jose Izaguirre presents today for   Chief Complaint   Patient presents with    Leg Pain     1m f/u       Jose Izaguirre preferred language for health care discussion is english/other. Is someone accompanying this pt? no    Is the patient using any DME equipment during OV? no    Depression Screening:  PHQ over the last two weeks 5/30/2018 11/10/2017 6/22/2017 2/6/2017 7/11/2016 5/12/2015 7/10/2014   Little interest or pleasure in doing things Several days More than half the days Not at all Not at all Not at all Not at all Not at all   Feeling down, depressed, irritable, or hopeless Several days More than half the days Not at all Not at all Not at all Not at all Not at all   Total Score PHQ 2 2 4 0 0 0 0 0       Learning Assessment:  Learning Assessment 7/10/2014   PRIMARY LEARNER Patient   HIGHEST LEVEL OF EDUCATION - PRIMARY LEARNER  GRADUATED HIGH SCHOOL OR GED   BARRIERS PRIMARY LEARNER NONE   CO-LEARNER CAREGIVER No   PRIMARY LANGUAGE ENGLISH   LEARNER PREFERENCE PRIMARY DEMONSTRATION   ANSWERED BY self   RELATIONSHIP SELF       Abuse Screening:  Abuse Screening Questionnaire 5/30/2018 11/10/2017 5/12/2015   Do you ever feel afraid of your partner? N N N   Are you in a relationship with someone who physically or mentally threatens you? N N N   Is it safe for you to go home? Adam Parikh       Fall Risk  Fall Risk Assessment, last 12 mths 7/26/2018 6/20/2018 5/30/2018 11/10/2017 6/22/2017 2/6/2017 7/11/2016   Able to walk? Yes Yes Yes Yes Yes Yes Yes   Fall in past 12 months? No No No No No No No       Health Maintenance reviewed and discussed per provider. Yes    Jose Izaguirre is due for   Health Maintenance Due   Topic Date Due    EYE EXAM RETINAL OR DILATED Q1  03/03/1960     Please order/place referral if appropriate. Advance Directive:  1. Do you have an advance directive in place? Patient Reply: yes    2. If not, would you like material regarding how to put one in place?  Patient Reply: no    Coordination of Care:  1. Have you been to the ER, urgent care clinic since your last visit? Hospitalized since your last visit? Yes, stents placed in ureters per pt    2. Have you seen or consulted any other health care providers outside of the [address] [place] [address] since your last visit? Include any pap smears or colon screening.  yes

## 2018-07-26 NOTE — MR AVS SNAPSHOT
[address] [place] [address] 
 
 
 [address] [facility] 83 [address] 
[phone] Patient: Tamera Caicedo MRN: [id number] BFE:[birth year] Visit Information Date & Time Provider Department Dept. Phone Encounter #  
 7/26/2018  1:15 PM [facility] [phone] [id number] Follow-up Instructions Return in about 6 weeks (around 9/6/2018) for chronic pain, check on med changes. Your Appointments 10/19/2018 11:00 AM  
ULTRASOUND with [facility] ULTRASOUND [facility] [address] ([facility]) Appt Note: JAMES Anh Damianryan patient [address] [facility] [id number]  
  
    
 10/19/2018 11:40 AM  
ESTABLISHED PATIENT with Fredy Ly NP [facility]. [address] ([facility]) Appt Note: 3 month follow up for stones, rv JAMES (in house) [address] [facility] [address]  
[phone]  
  
   
 [facility] [phone] Upcoming Health Maintenance Date Due  
 EYE EXAM RETINAL OR DILATED Q1 3/3/1960 DTaP/Tdap/Td series (1 - Tdap) 3/20/2019* GLAUCOMA SCREENING Q2Y 4/28/2020* Influenza Age 5 to Adult 8/1/2018 Pneumococcal 65+ Low/Medium Risk (2 of 2 - PCV13) 9/1/2018 MICROALBUMIN Q1 11/10/2018 HEMOGLOBIN A1C Q6M 12/20/2018 LIPID PANEL Q1 6/20/2019 MEDICARE YEARLY EXAM 6/21/2019 COLONOSCOPY 1/18/2020 *Topic was postponed. The date shown is not the original due date. Allergies as of 7/26/2018  Review Complete On: 7/26/2018 By: Rafael Bonilla MD  
 No Known Allergies Current Immunizations  Reviewed on 2/6/2018 Name Date Influenza Vaccine 9/1/2017 12:00 AM, 9/12/2016, 10/1/2013 12:00 AM  
 Influenza Vaccine (Quad) 10/3/2015  Influenza Vaccine PF 12/26/2014  2:06 PM  
 Pneumococcal Polysaccharide (PPSV-23) 9/1/2017 12:00 AM, 7/7/2013 12:00 AM  
 TB Skin Test (PPD) Intradermal 3/11/2009 12:00 AM  
  
 Not reviewed this visit You Were Diagnosed With   
  
 Codes Comments Neuropathic pain    -  Primary ICD-10-CM: M79.2 ICD-9-CM: 729.2 Pain of left lower extremity     ICD-10-CM: M79.605 ICD-9-CM: 729.5 Essential hypertension     ICD-10-CM: I10 
ICD-9-CM: 401.9 PAD (peripheral artery disease) (HCC)     ICD-10-CM: I73.9 ICD-9-CM: 443.9 Long term (current) use of opiate analgesic     ICD-10-CM: E94.379 ICD-9-CM: V58.69 Vitals BP Pulse Temp Resp Height(growth percentile) Weight(growth percentile) 139/77 (BP 1 Location: Right arm, BP Patient Position: Sitting) (!) 56 97.6 °F (36.4 °C) (Oral) 20 5' 8\" (1.727 m) 145 lb 6.4 oz (66 kg) SpO2 BMI Smoking Status 98% 22.11 kg/m2 Former Smoker Vitals History BMI and BSA Data Body Mass Index Body Surface Area  
 22.11 kg/m 2 1.78 m 2 Preferred Pharmacy Pharmacy Name Phone [address] [place] [address], [address] [place] [address] [phone] Your Updated Medication List  
  
   
This list is accurate as of 7/26/18  2:17 PM.  Always use your most recent med list.  
  
  
  
  
 albuterol 90 mcg/actuation inhaler Commonly known as:  PROVENTIL HFA, VENTOLIN HFA, PROAIR HFA Take 1 Puff by inhalation every six (6) hours as needed for Wheezing. aspirin 81 mg chewable tablet 81 mg.  
  
 clopidogrel 75 mg Tab Commonly known as:  PLAVIX Take 1 Tab by mouth daily. Indications: PERIPHERAL ARTERIAL THROMBOEMBOLISM PREVENTION DULoxetine 30 mg capsule Commonly known as:  CYMBALTA Take 1 Cap by mouth daily. Indications: NEUROPATHIC PAIN  
  
 losartan-hydroCHLOROthiazide 100-25 mg per tablet Commonly known as:  HYZAAR Take 1 Tab by mouth daily. Indications: hypertension  
  
 metFORMIN 500 mg tablet Commonly known as:  GLUCOPHAGE Take 1 Tab by mouth daily (with breakfast). Indications: type 2 diabetes mellitus  
  
 metoprolol succinate 50 mg XL tablet Commonly known as:  TOPROL-XL Take 1 Tab by mouth daily. Indications: hypertension  
  
 nortriptyline 50 mg capsule Commonly known as:  PAMELOR Take 1 Cap by mouth nightly. oxyCODONE IR 10 mg Tab immediate release tablet Commonly known as:  Stana Worthington Take 1 Tab by mouth every four (4) hours as needed. Max Daily Amount: 60 mg. Indications: Pain  
  
 pregabalin 150 mg capsule Commonly known as:  Shelda Prime Take 1 Cap by mouth two (2) times a day. Max Daily Amount: 300 mg. Indications: Diabetic Peripheral Neuropathy SIMVASTATIN PO Take 1 Tab by Mouth Once a Day. therapeutic multivitamin-minerals tablet Commonly known as:  THERAGRAN-M Take 1 Tab by Mouth Once a Day. trospium 20 mg tablet Commonly known as:  Maki [place] Take 20 mg by mouth two (2) times daily as needed. Prescriptions Printed Refills  
 oxyCODONE IR (ROXICODONE) 10 mg tab immediate release tablet 0 Sig: Take 1 Tab by mouth every four (4) hours as needed. Max Daily Amount: 60 mg. Indications: Pain Class: Print Route: Oral  
  
Prescriptions Sent to Pharmacy Refills  
 clopidogrel (PLAVIX) 75 mg tab 4 Sig: Take 1 Tab by mouth daily. Indications: PERIPHERAL ARTERIAL THROMBOEMBOLISM PREVENTION Class: Normal  
 Pharmacy: [address] [place] [address] Ph #: [phone] Route: Oral  
 losartan-hydroCHLOROthiazide (HYZAAR) 100-25 mg per tablet 4 Sig: Take 1 Tab by mouth daily. Indications: hypertension Class: Normal  
 Pharmacy: [address] [place] [address] Ph #: [phone] Route: Oral  
 metFORMIN (GLUCOPHAGE) 500 mg tablet 4 Sig: Take 1 Tab by mouth daily (with breakfast).  Indications: type 2 diabetes mellitus Class: Normal  
 Pharmacy: [address] [place] [address] Ph #: [phone] Route: Oral  
 nortriptyline (PAMELOR) 50 mg capsule 4 Sig: Take 1 Cap by mouth nightly. Class: Normal  
 Pharmacy: [address] [place] [address] Ph #: [phone] Route: Oral  
 metoprolol succinate (TOPROL-XL) 50 mg XL tablet 4 Sig: Take 1 Tab by mouth daily. Indications: hypertension Class: Normal  
 Pharmacy: [address] [place] [address] Ph #: [phone] Route: Oral  
 DULoxetine (CYMBALTA) 30 mg capsule 5 Sig: Take 1 Cap by mouth daily. Indications: NEUROPATHIC PAIN Class: Normal  
 Pharmacy: [address] [place] [address] Ph #: [phone] Route: Oral  
  
Follow-up Instructions Return in about 6 weeks (around 9/6/2018) for chronic pain, check on med changes. To-Do List   
 07/26/2018 Lab:  Yesenia Beatty 13 ()   
  
  
Introducing [facility] & HE[facility] SERVICES! Dear Jose Cruz Garcia: Thank you for requesting a [employer] account. Our records indicate that you already have an active Silent [employer] account. You can access your account anytime at [email or web address] Did you know that you can access your hospital and ER discharge instructions at any time in Silent [employer]? You can also review all of your test results from your hospital stay or ER visit. Additional Information If you have questions, please visit the Frequently Asked Questions section of the Silent Power website at [email or web address] Remember, Silent Power is NOT to be used for urgent needs. For medical emergencies, dial 911. Now available from your iPhone and Android! Please provide this summary of care documentation to your next provider. Your primary care clinician is listed as [facility]. If you have any questions after today's visit, please call [phone].

## 2018-08-02 LAB — DRUGS UR: NORMAL

## 2018-09-06 ENCOUNTER — OFFICE VISIT (OUTPATIENT)
Dept: INTERNAL MEDICINE CLINIC | Age: 68
End: 2018-09-06

## 2018-09-06 VITALS
HEIGHT: 68 IN | RESPIRATION RATE: 18 BRPM | TEMPERATURE: 95.6 F | SYSTOLIC BLOOD PRESSURE: 165 MMHG | OXYGEN SATURATION: 99 % | DIASTOLIC BLOOD PRESSURE: 83 MMHG | HEART RATE: 56 BPM | BODY MASS INDEX: 23.37 KG/M2 | WEIGHT: 154.2 LBS

## 2018-09-06 DIAGNOSIS — M79.605 PAIN OF LEFT LOWER EXTREMITY: ICD-10-CM

## 2018-09-06 DIAGNOSIS — I73.9 PAD (PERIPHERAL ARTERY DISEASE) (HCC): Chronic | ICD-10-CM

## 2018-09-06 DIAGNOSIS — E78.00 HYPERCHOLESTEROLEMIA: Chronic | ICD-10-CM

## 2018-09-06 DIAGNOSIS — I10 ESSENTIAL HYPERTENSION: Chronic | ICD-10-CM

## 2018-09-06 DIAGNOSIS — E11.40 TYPE 2 DIABETES MELLITUS WITH DIABETIC NEUROPATHY, WITHOUT LONG-TERM CURRENT USE OF INSULIN (HCC): Primary | Chronic | ICD-10-CM

## 2018-09-06 DIAGNOSIS — M79.2 NEUROPATHIC PAIN: ICD-10-CM

## 2018-09-06 DIAGNOSIS — Z23 ENCOUNTER FOR IMMUNIZATION: ICD-10-CM

## 2018-09-06 RX ORDER — AMLODIPINE BESYLATE 5 MG/1
5 TABLET ORAL DAILY
Qty: 30 TAB | Refills: 5 | Status: SHIPPED | OUTPATIENT
Start: 2018-09-06 | End: 2019-03-13 | Stop reason: SDUPTHER

## 2018-09-06 RX ORDER — OXYCODONE HYDROCHLORIDE 10 MG/1
10 TABLET ORAL
Qty: 60 TAB | Refills: 0 | Status: SHIPPED | OUTPATIENT
Start: 2018-09-06 | End: 2018-10-18

## 2018-09-06 RX ORDER — DULOXETIN HYDROCHLORIDE 30 MG/1
30 CAPSULE, DELAYED RELEASE ORAL DAILY
Qty: 30 CAP | Refills: 5 | Status: SHIPPED | OUTPATIENT
Start: 2018-09-06 | End: 2018-10-26

## 2018-09-06 RX ORDER — SIMVASTATIN 40 MG/1
40 TABLET, FILM COATED ORAL
Qty: 30 TAB | Refills: 5 | Status: SHIPPED | OUTPATIENT
Start: 2018-09-06 | End: 2018-10-26

## 2018-09-06 RX ORDER — MELOXICAM 15 MG/1
TABLET ORAL
COMMUNITY
Start: 2018-05-30

## 2018-09-06 NOTE — MR AVS SNAPSHOT
[address] [place] [address] 
 
 
 [address] [facility] 83 [address] [phone] Patient: Jose Izaguirre MRN: [id number] HHI:9/8/6686 Visit Information Date & Time Provider Department Dept. Phone Encounter #  
 9/6/2018  1:15 PM [facility] [phone] [id number] Follow-up Instructions Return in about 6 weeks (around 10/18/2018) for htn, check on med changes. Your Appointments 10/19/2018 11:00 AM  
ULTRASOUND with [facility] ULTRASOUND [facility]. [address] ([address] [place] [address]) Appt Note: JAMES Littlejohn Hays patient [address] [place] [address]  
  
    
 10/19/2018 11:40 AM  
ESTABLISHED PATIENT with Jeff Son, NP [facility]. [address] ([address] [place] [address]) Appt Note: 3 month follow up for stones, rv JAMES (in house) [address] [address]  
[phone]  
  
   
 [place] [address] Upcoming Health Maintenance Date Due  
 EYE EXAM RETINAL OR DILATED Q1 3/3/1960 Influenza Age 5 to Adult 8/1/2018 Pneumococcal 65+ Low/Medium Risk (2 of 2 - PCV13) 9/1/2018 DTaP/Tdap/Td series (1 - Tdap) 3/20/2019* GLAUCOMA SCREENING Q2Y 4/28/2020* MICROALBUMIN Q1 11/10/2018 HEMOGLOBIN A1C Q6M 12/20/2018 LIPID PANEL Q1 6/20/2019 MEDICARE YEARLY EXAM 6/21/2019 COLONOSCOPY 1/18/2020 *Topic was postponed. The date shown is not the original due date. Allergies as of 9/6/2018  Review Complete On: 9/6/2018 By: Eligio Loja MD  
 No Known Allergies Current Immunizations  Reviewed on 2/6/2018 Name Date Influenza Vaccine 9/1/2017 12:00 AM, 9/12/2016, 10/1/2013 12:00 AM  
 Influenza Vaccine (Quad) 10/3/2015 Influenza Vaccine PF 12/26/2014  2:06 PM  
 Pneumococcal Conjugate (PCV-13)  Incomplete Pneumococcal Polysaccharide (PPSV-23) 9/1/2017 12:00 AM, 7/7/2013 12:00 AM  
 TB Skin Test (PPD) Intradermal 3/11/2009 12:00 AM  
  
 Not reviewed this visit You Were Diagnosed With   
  
 Codes Comments Type 2 diabetes mellitus with diabetic neuropathy, without long-term current use of insulin (HCC)    -  Primary ICD-10-CM: E11.40 ICD-9-CM: 250.60, 357.2 PAD (peripheral artery disease) (HCC)     ICD-10-CM: I73.9 ICD-9-CM: 443.9 Hypercholesterolemia     ICD-10-CM: E78.00 ICD-9-CM: 272.0 Essential hypertension     ICD-10-CM: I10 
ICD-9-CM: 401.9 Neuropathic pain     ICD-10-CM: M79.2 ICD-9-CM: 729.2 Pain of left lower extremity     ICD-10-CM: M79.605 ICD-9-CM: 729.5 Encounter for immunization     ICD-10-CM: F46 ICD-9-CM: V03.89 Vitals BP Pulse Temp Resp Height(growth percentile) Weight(growth percentile) 165/83 (BP 1 Location: Right arm, BP Patient Position: Sitting) (!) 56 95.6 °F (35.3 °C) (Oral) 18 5' 8\" (1.727 m) 154 lb 3.2 oz (69.9 kg) SpO2 BMI Smoking Status 99% 23.45 kg/m2 Former Smoker Vitals History BMI and BSA Data Body Mass Index Body Surface Area  
 23.45 kg/m 2 1.83 m 2 Preferred Pharmacy Pharmacy Name Phone [facility] [address], [address] [place] [address] [phone] Your Updated Medication List  
  
   
This list is accurate as of 9/6/18  2:02 PM.  Always use your most recent med list.  
  
  
  
  
 albuterol 90 mcg/actuation inhaler Commonly known as:  PROVENTIL HFA, VENTOLIN HFA, PROAIR HFA Take 1 Puff by inhalation every six (6) hours as needed for Wheezing. amLODIPine 5 mg tablet Commonly known as:  [place] Mary Take 1 Tab by mouth daily. aspirin 81 mg chewable tablet 81 mg.  
  
 clopidogrel 75 mg Tab Commonly known as:  PLAVIX Take 1 Tab by mouth daily. Indications: PERIPHERAL ARTERIAL THROMBOEMBOLISM PREVENTION DULoxetine 30 mg capsule Commonly known as:  CYMBALTA Take 1 Cap by mouth daily. Indications: NEUROPATHIC PAIN  
  
 losartan-hydroCHLOROthiazide 100-25 mg per tablet Commonly known as:  HYZAAR Take 1 Tab by mouth daily. Indications: hypertension  
  
 meloxicam 15 mg tablet Commonly known as:  MOBIC  
  
 metFORMIN 500 mg tablet Commonly known as:  GLUCOPHAGE Take 1 Tab by mouth daily (with breakfast). Indications: type 2 diabetes mellitus  
  
 oxyCODONE IR 10 mg Tab immediate release tablet Commonly known as:  Antonjacquelynte Richardson Take 1 Tab by mouth every four (4) hours as needed. Max Daily Amount: 60 mg. Indications: Pain  
  
 pregabalin 150 mg capsule Commonly known as:  Nyoka Cotton Take 1 Cap by mouth two (2) times a day. Max Daily Amount: 300 mg. Indications: Diabetic Peripheral Neuropathy  
  
 simvastatin 40 mg tablet Commonly known as:  ZOCOR Take 1 Tab by mouth nightly. therapeutic multivitamin-minerals tablet Commonly known as:  THERAGRAN-M Take 1 Tab by Mouth Once a Day. trospium 20 mg tablet Commonly known as:  Maki [place] Take 20 mg by mouth two (2) times daily as needed. Prescriptions Printed Refills  
 oxyCODONE IR (ROXICODONE) 10 mg tab immediate release tablet 0 Sig: Take 1 Tab by mouth every four (4) hours as needed. Max Daily Amount: 60 mg. Indications: Pain Class: Print Route: Oral  
  
Prescriptions Sent to Pharmacy Refills  
 simvastatin (ZOCOR) 40 mg tablet 5 Sig: Take 1 Tab by mouth nightly. Class: Normal  
 Pharmacy: [address] Ph #: [phone] Route: Oral  
 DULoxetine (CYMBALTA) 30 mg capsule 5 Sig: Take 1 Cap by mouth daily. Indications: NEUROPATHIC PAIN Class: Normal  
 Pharmacy: [address] Ph #: [phone] Route: Oral  
 amLODIPine (NORVASC) 5 mg tablet 5 Sig: Take 1 Tab by mouth daily. Class: Normal  
 [facility] [address], [address] [place] [address] #: [phone] Route: Oral  
  
We Performed the Following ADMIN PNEUMOCOCCAL VACCINE [ HCP] PNEUMOCOCCAL CONJ VACCINE 13 VALENT IM [id number] CPT(R)] Follow-up Instructions Return in about 6 weeks (around 10/18/2018) for htn, check on med changes. Introducing [facility] & HEALTH SERVICES! Dear Christine Ronquillo: Thank you for requesting a Dexterra account. Our records indicate that you already have an active Dexterra account. You can access your account anytime at [email or web address] Did you know that you can access your hospital and ER discharge instructions at any time in Dexterra? You can also review all of your test results from your hospital stay or ER visit. Additional Information If you have questions, please visit the Frequently Asked Questions section of the Dexterra website at [email or web address] Remember, Dexterra is NOT to be used for urgent needs. For medical emergencies, dial 911. Now available from your iPhone and Android! Please provide this summary of care documentation to your next provider. Your primary care clinician is listed as [facility]. If you have any questions after today's visit, please call [phone].

## 2018-09-06 NOTE — PROGRESS NOTES
ROOM # 4 Ca Huerta presents today for Chief Complaint Patient presents with  Pain (Chronic) 6w f/u Ca Huerta preferred language for health care discussion is english/other. Is someone accompanying this pt? no 
 
Is the patient using any DME equipment during OV? no 
 
Depression Screening: PHQ over the last two weeks 5/30/2018 11/10/2017 6/22/2017 2/6/2017 7/11/2016 5/12/2015 7/10/2014 Little interest or pleasure in doing things Several days More than half the days Not at all Not at all Not at all Not at all Not at all Feeling down, depressed, irritable, or hopeless Several days More than half the days Not at all Not at all Not at all Not at all Not at all Total Score PHQ 2 2 4 0 0 0 0 0 Learning Assessment: 
Learning Assessment 7/10/2014 PRIMARY LEARNER Patient HIGHEST LEVEL OF EDUCATION - PRIMARY LEARNER  GRADUATED HIGH SCHOOL OR GED  
BARRIERS PRIMARY LEARNER NONE  
CO-LEARNER CAREGIVER No  
PRIMARY LANGUAGE ENGLISH  
LEARNER PREFERENCE PRIMARY DEMONSTRATION  
ANSWERED BY self RELATIONSHIP SELF Abuse Screening: 
Abuse Screening Questionnaire 5/30/2018 11/10/2017 5/12/2015 Do you ever feel afraid of your partner? N N N Are you in a relationship with someone who physically or mentally threatens you? N N N Is it safe for you to go home? Nikunj Brand Fall Risk Fall Risk Assessment, last 12 mths 9/6/2018 7/26/2018 6/20/2018 5/30/2018 11/10/2017 6/22/2017 2/6/2017 Able to walk? Yes Yes Yes Yes Yes Yes Yes Fall in past 12 months? No No No No No No No  
 
 
Health Maintenance reviewed and discussed per provider. Yes Ca Huerta is due for Health Maintenance Due Topic Date Due  
 EYE EXAM RETINAL OR DILATED Q1  03/03/1960  Influenza Age 5 to Adult  08/01/2018  Pneumococcal 65+ Low/Medium Risk (2 of 2 - PCV13) 09/01/2018 Please order/place referral if appropriate. Advance Directive: 1. Do you have an advance directive in place? Patient Reply: yes 2. If not, would you like material regarding how to put one in place? Patient Reply: no 
 
Coordination of Care: 1. Have you been to the ER, urgent care clinic since your last visit? Hospitalized since your last visit? no 
 
2. Have you seen or consulted any other health care providers outside of the [address] [place] [address] since your last visit? Include any pap smears or colon screening. no 
 
 
Immunization/s administered 9/6/2018 by Ulysses Manuel LPN with guardian's consent. Patient tolerated procedure well. No reactions noted.

## 2018-09-06 NOTE — PROGRESS NOTES
HISTORY OF PRESENT ILLNESS Judith Clifford is a 80 y.o. male. Visit Vitals  /83 (BP 1 Location: Right arm, BP Patient Position: Sitting)  Pulse (!) 56  Temp 95.6 °F (35.3 °C) (Oral)  Resp 18  Ht 5' 8\" (1.727 m)  Wt 154 lb 3.2 oz (69.9 kg)  SpO2 99%  BMI 23.45 kg/m2 HPI Comments: Lots of knee pain 
 
significant vascular disease Peripheral neuropathy pain Hypertension The history is provided by the patient. This is a chronic problem. The current episode started more than 1 week ago. The problem has not changed since onset. Pertinent negatives include no headaches and no dizziness. There are no associated agents to hypertension. Risk factors include male gender and stress. Other The history is provided by the patient (peripheral arterial disease). This is a chronic problem. The current episode started more than 1 week ago. The problem occurs daily. The problem has not changed since onset. Pertinent negatives include no headaches. Review of Systems Constitutional: Negative. Respiratory: Negative. Cardiovascular: Negative. Neurological: Positive for tingling and sensory change. Negative for dizziness and headaches. Physical Exam  
Constitutional: He is oriented to person, place, and time. He appears well-developed and well-nourished. No distress. Cardiovascular: Normal rate and regular rhythm. Pulmonary/Chest: Effort normal.  
Neurological: He is alert and oriented to person, place, and time. Skin: Skin is warm and dry. He is not diaphoretic. Psychiatric: He has a normal mood and affect. Nursing note and vitals reviewed. ASSESSMENT and PLAN 
  ICD-10-CM ICD-9-CM 1. Type 2 diabetes mellitus with diabetic neuropathy, without long-term current use of insulin ([facility]) E11.40 250.60   
  357.2 2. PAD (peripheral artery disease) ([facility]) I73.9 443.9 simvastatin (ZOCOR) 40 mg tablet 3. Hypercholesterolemia E78.00 272.0 simvastatin (ZOCOR) 40 mg tablet 4. Essential hypertension I10 401.9 amLODIPine (NORVASC) 5 mg tablet 5. Neuropathic pain M79.2 729.2 oxyCODONE IR (ROXICODONE) 10 mg tab immediate release tablet DULoxetine (CYMBALTA) 30 mg capsule 6. Pain of left lower extremity M79.605 729.5 DULoxetine (CYMBALTA) 30 mg capsule 7. Encounter for immunization Z23 V03.89 ADMIN PNEUMOCOCCAL VACCINE  
   PNEUMOCOCCAL CONJ VACCINE 13 VALENT IM He says he never got the pamelor or Cymbalta Discussed his + drug test for [facility]. I will give him one more RX of percocet to prevent acute withdrawal. But he must get this elsewhere from now on Will refill the cymbalta He denied getting the letter with this info in it. Refills as noted Pt says he did not start the metoprolol So will start norvasc, which is also helpful for the circulation Recheck BP and chol in 6 weeks

## 2018-10-18 ENCOUNTER — HOSPITAL ENCOUNTER (OUTPATIENT)
Dept: LAB | Age: 68
Discharge: HOME | End: 2018-10-18
Payer: MEDICARE

## 2018-10-18 ENCOUNTER — OFFICE VISIT (OUTPATIENT)
Dept: INTERNAL MEDICINE CLINIC | Age: 68
End: 2018-10-18

## 2018-10-18 VITALS
WEIGHT: 155.4 LBS | HEIGHT: 68 IN | BODY MASS INDEX: 23.55 KG/M2 | RESPIRATION RATE: 12 BRPM | OXYGEN SATURATION: 97 % | DIASTOLIC BLOOD PRESSURE: 65 MMHG | HEART RATE: 71 BPM | SYSTOLIC BLOOD PRESSURE: 129 MMHG | TEMPERATURE: 96.1 F

## 2018-10-18 DIAGNOSIS — E11.40 TYPE 2 DIABETES MELLITUS WITH DIABETIC NEUROPATHY, WITHOUT LONG-TERM CURRENT USE OF INSULIN (HCC): ICD-10-CM

## 2018-10-18 DIAGNOSIS — I73.9 PAD (PERIPHERAL ARTERY DISEASE) (HCC): ICD-10-CM

## 2018-10-18 DIAGNOSIS — M79.2 NEUROPATHIC PAIN: ICD-10-CM

## 2018-10-18 DIAGNOSIS — I10 ESSENTIAL HYPERTENSION: Primary | ICD-10-CM

## 2018-10-18 PROCEDURE — 80048 BASIC METABOLIC PNL TOTAL CA: CPT | Performed by: INTERNAL MEDICINE

## 2018-10-18 PROCEDURE — 82043 UR ALBUMIN QUANTITATIVE: CPT | Performed by: INTERNAL MEDICINE

## 2018-10-18 PROCEDURE — 83036 HEMOGLOBIN GLYCOSYLATED A1C: CPT | Performed by: INTERNAL MEDICINE

## 2018-10-18 PROCEDURE — 80061 LIPID PANEL: CPT | Performed by: INTERNAL MEDICINE

## 2018-10-18 PROCEDURE — 36415 COLL VENOUS BLD VENIPUNCTURE: CPT | Performed by: INTERNAL MEDICINE

## 2018-10-18 RX ORDER — OXYCODONE HYDROCHLORIDE 10 MG/1
10 TABLET ORAL
Qty: 90 TAB | Refills: 0 | Status: SHIPPED | OUTPATIENT
Start: 2018-10-18 | End: 2018-10-26

## 2018-10-18 RX ORDER — OXYCODONE HYDROCHLORIDE 10 MG/1
10 TABLET ORAL
Qty: 90 TAB | Refills: 0 | Status: SHIPPED | OUTPATIENT
Start: 2018-10-18 | End: 2019-01-18 | Stop reason: SDUPTHER

## 2018-10-18 NOTE — LETTER
10/21/2018 9:47 PM 
 
Mr. Katerine Higginbotham [address] [facility] [id number] Dear Katerine Higginbotham: 
 
Please find your most recent results below. Resulted Orders METABOLIC PANEL, BASIC Result Value Ref Range Sodium 143 136 - 145 mmol/L Potassium 4.5 3.5 - 5.5 mmol/L Chloride 108 100 - 108 mmol/L  
 CO2 30 21 - 32 mmol/L Anion gap 5 3.0 - 18 mmol/L Glucose 79 74 - 99 mg/dL BUN 13 7.0 - 18 MG/DL Creatinine 1.12 0.6 - 1.3 MG/DL  
 BUN/Creatinine ratio 12 12 - 20 GFR est AA >60 >60 ml/min/1.73m2 GFR est non-AA >60 >60 ml/min/1.73m2 Comment:  
   (NOTE) Estimated GFR is calculated using the Modification of Diet in Renal  
Disease (MDRD) Study equation, reported for both  [facility]) and non- Americans (GFRNA), and normalized to 1.73m2  
body surface area. The physician must decide which value applies to  
the patient. The MDRD study equation should only be used in  
individuals age 25 or older. It has not been validated for the  
following: pregnant women, patients with serious comorbid conditions,  
or on certain medications, or persons with extremes of body size,  
muscle mass, or nutritional status. Calcium 9.4 8.5 - 10.1 MG/DL  
HEMOGLOBIN A1C W/O EAG Result Value Ref Range Hemoglobin A1c 5.7 (H) 4.2 - 5.6 % Comment:  
   (NOTE) HbA1C Interpretive Ranges <5.7              Normal 
5.7 - 6.4         Consider Prediabetes >6.5              Consider Diabetes MICROALBUMIN, UR, RAND W/ MICROALB/CREAT RATIO Result Value Ref Range Microalbumin,urine random 0.70 0 - 3.0 MG/DL Creatinine, urine 148.80 (H) 30 - 125 mg/dL Microalbumin/Creat ratio (mg/g creat) 5 0 - 30 mg/g LIPID PANEL Result Value Ref Range LIPID PROFILE Cholesterol, total 165 <200 MG/DL Triglyceride 76 <150 MG/DL Comment:  
   The drugs N-acetylcysteine (NAC) and 
Metamiszole have been found to cause falsely low results in this chemical assay. Please 
be sure to submit blood samples obtained BEFORE administration of either of these 
drugs to assure correct results. HDL Cholesterol 63 (H) 40 - 60 MG/DL  
 LDL, calculated 86.8 0 - 100 MG/DL VLDL, calculated 15.2 MG/DL  
 CHOL/HDL Ratio 2.6 0 - 5.0    
 
 
RECOMMENDATIONS: 
None. Keep up the good work! Please call me if you have any questions: [phone] Sincerely, [facility]REACH INSURANCE

## 2018-10-18 NOTE — PROGRESS NOTES
Jesse Zuñiga is a 80 y.o. male (: 1950) presenting to address: Chief Complaint Patient presents with  Hypertension Vitals:  
 10/18/18 1313 BP: 129/65 Pulse: 71 Resp: 12 Temp: 96.1 °F (35.6 °C) SpO2: 97% Weight: 155 lb 6.4 oz (70.5 kg) Height: 5' 8\" (1.727 m) PainSc:   8 PainLoc: Generalized Hearing/Vision: No exam data present Learning Assessment:  
 
Learning Assessment 7/10/2014 PRIMARY LEARNER Patient HIGHEST LEVEL OF EDUCATION - PRIMARY LEARNER  GRADUATED HIGH SCHOOL OR GED  
BARRIERS PRIMARY LEARNER NONE  
CO-LEARNER CAREGIVER No  
PRIMARY LANGUAGE ENGLISH  
LEARNER PREFERENCE PRIMARY DEMONSTRATION  
ANSWERED BY self RELATIONSHIP SELF Depression Screening: PHQ over the last two weeks 10/18/2018 Little interest or pleasure in doing things Not at all Feeling down, depressed, irritable, or hopeless Not at all Total Score PHQ 2 0 Fall Risk Assessment:  
 
Fall Risk Assessment, last 12 mths 10/18/2018 Able to walk? Yes Fall in past 12 months? No  
 
Abuse Screening:  
 
Abuse Screening Questionnaire 2018 Do you ever feel afraid of your partner? Lacinda Flurry Are you in a relationship with someone who physically or mentally threatens you? Lacinda Flurry Is it safe for you to go home? Reg Coyne Coordination of Care Questionaire: 1. Have you been to the ER, urgent care clinic since your last visit? Hospitalized since your last visit? NO 
 
2. Have you seen or consulted any other health care providers outside of the [address] [place] [address] since your last visit? Include any pap smears or colon screening. NO Advanced Directive: 1. Do you have an Advanced Directive? YES 
 
2. Would you like information on Advanced Directives?  NO

## 2018-10-18 NOTE — PROGRESS NOTES
HISTORY OF PRESENT ILLNESS Jaycee Yepez is a 80 y.o. male. Pulmonary is working up a pulmonary nodule in RLL Hypertension The history is provided by the patient. This is a chronic problem. The current episode started more than 1 week ago. The problem has been gradually improving. Pertinent negatives include no chest pain, no palpitations, no headaches, no dizziness and no shortness of breath. Neurologic Problem History provided by: severe peripheral neuropathy. This is a chronic problem. The current episode started more than 1 week ago. The problem has been gradually worsening. Pertinent negatives include no shortness of breath, no chest pain and no headaches. Review of Systems Constitutional: Negative for chills and fever. Respiratory: Negative for shortness of breath. Cardiovascular: Negative for chest pain and palpitations. Neurological: Negative for dizziness and headaches. Physical Exam  
Constitutional: He is oriented to person, place, and time. He appears well-developed and well-nourished. No distress. Cardiovascular: Normal rate and regular rhythm. Pulmonary/Chest: Effort normal and breath sounds normal.  
Few scattered rhonchi Musculoskeletal: He exhibits no edema. Neurological: He is alert and oriented to person, place, and time. Skin: Skin is warm and dry. He is not diaphoretic. Psychiatric: He has a normal mood and affect. Nursing note and vitals reviewed. ASSESSMENT and PLAN 
  ICD-10-CM ICD-9-CM 1. Essential hypertension I10 401.9 2. Neuropathic pain M79.2 729.2 oxyCODONE IR (ROXICODONE) 10 mg tab immediate release tablet  
   oxyCODONE IR (ROXICODONE) 10 mg tab immediate release tablet  
   oxyCODONE IR (ROXICODONE) 10 mg tab immediate release tablet 3.  Type 2 diabetes mellitus with diabetic neuropathy, without long-term current use of insulin ([facility]) G52.72 706.40 METABOLIC PANEL, BASIC  
  357.2 HEMOGLOBIN A1C W/O EAG  
 MICROALBUMIN, UR, RAND W/ MICROALB/CREAT RATIO  
   LIPID PANEL 4. PAD (peripheral artery disease) ([facility]) I73.9 443.9 LIPID PANEL  
 
 
BP controlled today Due for updated lab F/u 3 months for med refills. For chronic pain

## 2018-10-19 LAB
ANION GAP SERPL CALC-SCNC: 5 MMOL/L (ref 3–18)
BUN SERPL-MCNC: 13 MG/DL (ref 7–18)
BUN/CREAT SERPL: 12 (ref 12–20)
CALCIUM SERPL-MCNC: 9.4 MG/DL (ref 8.5–10.1)
CHLORIDE SERPL-SCNC: 108 MMOL/L (ref 100–108)
CHOLEST SERPL-MCNC: 165 MG/DL
CO2 SERPL-SCNC: 30 MMOL/L (ref 21–32)
CREAT SERPL-MCNC: 1.12 MG/DL (ref 0.6–1.3)
CREAT UR-MCNC: 148.8 MG/DL (ref 30–125)
GLUCOSE SERPL-MCNC: 79 MG/DL (ref 74–99)
HBA1C MFR BLD: 5.7 % (ref 4.2–5.6)
HDLC SERPL-MCNC: 63 MG/DL (ref 40–60)
HDLC SERPL: 2.6 {RATIO} (ref 0–5)
LDLC SERPL CALC-MCNC: 86.8 MG/DL (ref 0–100)
LIPID PROFILE,FLP: ABNORMAL
MICROALBUMIN UR-MCNC: 0.7 MG/DL (ref 0–3)
MICROALBUMIN/CREAT UR-RTO: 5 MG/G (ref 0–30)
POTASSIUM SERPL-SCNC: 4.5 MMOL/L (ref 3.5–5.5)
SODIUM SERPL-SCNC: 143 MMOL/L (ref 136–145)
TRIGL SERPL-MCNC: 76 MG/DL (ref ?–150)
VLDLC SERPL CALC-MCNC: 15.2 MG/DL

## 2018-10-26 PROBLEM — R55 SYNCOPE AND COLLAPSE: Status: ACTIVE | Noted: 2017-01-15

## 2018-10-26 PROBLEM — I77.1 ARTERIAL INSUFFICIENCY (HCC): Status: ACTIVE | Noted: 2017-10-21

## 2018-10-26 PROBLEM — T82.7XXA INFECTED AORTIC GRAFT (HCC): Status: ACTIVE | Noted: 2018-04-30

## 2018-10-26 PROBLEM — K29.60 EROSIVE GASTRITIS: Status: ACTIVE | Noted: 2017-01-15

## 2018-10-26 PROBLEM — I16.0 HYPERTENSIVE URGENCY: Status: ACTIVE | Noted: 2018-04-30

## 2018-10-26 PROBLEM — I70.229 CRITICAL LOWER LIMB ISCHEMIA (HCC): Status: ACTIVE | Noted: 2018-03-31

## 2018-10-26 PROBLEM — R10.9 ABDOMINAL PAIN: Status: ACTIVE | Noted: 2018-04-30

## 2018-11-27 RX ORDER — LOSARTAN POTASSIUM 100 MG/1
TABLET ORAL
Qty: 90 TAB | Refills: 1 | Status: SHIPPED | OUTPATIENT
Start: 2018-11-27 | End: 2019-02-28 | Stop reason: SDUPTHER

## 2018-11-27 NOTE — TELEPHONE ENCOUNTER
Pt contacted at mobile number. 2 pt identifiers confirmed. Pt checked medications and states that he is currently taking Losartan- potassium (cozaar) not Losartan-hydrochlorothiazide (Hyzaar)     Please be advised.

## 2018-12-14 ENCOUNTER — TELEPHONE (OUTPATIENT)
Dept: INTERNAL MEDICINE CLINIC | Age: 68
End: 2018-12-14

## 2018-12-14 NOTE — TELEPHONE ENCOUNTER
Patient having an [place] [address] meeting on 12/21/2018 at 1 pm would like to know if you would like to join in regarding his care please advise

## 2019-01-08 DIAGNOSIS — M79.605 PAIN OF LEFT LOWER EXTREMITY: ICD-10-CM

## 2019-01-08 RX ORDER — PREGABALIN 150 MG/1
CAPSULE ORAL
Qty: 60 CAP | Refills: 5 | Status: SHIPPED | OUTPATIENT
Start: 2019-01-08

## 2019-01-15 NOTE — TELEPHONE ENCOUNTER
Requested Prescriptions     Pending Prescriptions Disp Refills    oxyCODONE-acetaminophen (PERCOCET) 5-325 mg per tablet

## 2019-01-15 NOTE — TELEPHONE ENCOUNTER
PCP: Terence Rosales MD    Last appt: 10/18/2018  Future Appointments   Date Time Provider Chio Hdez   1/18/2019 12:30 PM Terence Rosales MD KARI GUTIERREZ   4/26/2019 10:15 AM Annamaria Benton MD [facility] [facility]       Requested Prescriptions     Pending Prescriptions Disp Refills    oxyCODONE-acetaminophen (PERCOCET) 5-325 mg per tablet         Request for a 30 or 90 day supply? Provider Discretion    Pharmacy: [address] [place] [address]    Other Comments:   printed and placed in PCP medication refill review folder.      Last UDS date: 07/26/2018  Pain contract signed: 01/18/2016

## 2019-01-18 ENCOUNTER — OFFICE VISIT (OUTPATIENT)
Dept: INTERNAL MEDICINE CLINIC | Age: 69
End: 2019-01-18

## 2019-01-18 ENCOUNTER — HOSPITAL ENCOUNTER (OUTPATIENT)
Dept: LAB | Age: 69
Discharge: HOME | End: 2019-01-18
Payer: MEDICARE

## 2019-01-18 VITALS
HEART RATE: 75 BPM | HEIGHT: 68 IN | BODY MASS INDEX: 23.49 KG/M2 | DIASTOLIC BLOOD PRESSURE: 74 MMHG | TEMPERATURE: 97.9 F | WEIGHT: 155 LBS | OXYGEN SATURATION: 97 % | SYSTOLIC BLOOD PRESSURE: 124 MMHG | RESPIRATION RATE: 14 BRPM

## 2019-01-18 DIAGNOSIS — M65.4 DE QUERVAIN'S TENOSYNOVITIS, RIGHT: ICD-10-CM

## 2019-01-18 DIAGNOSIS — Z79.891 ENCOUNTER FOR LONG-TERM USE OF OPIATE ANALGESIC: ICD-10-CM

## 2019-01-18 DIAGNOSIS — M25.531 RIGHT WRIST PAIN: ICD-10-CM

## 2019-01-18 DIAGNOSIS — M79.2 NEUROPATHIC PAIN: Primary | ICD-10-CM

## 2019-01-18 PROCEDURE — G0480 DRUG TEST DEF 1-7 CLASSES: HCPCS

## 2019-01-18 RX ORDER — DULOXETIN HYDROCHLORIDE 30 MG/1
CAPSULE, DELAYED RELEASE ORAL
COMMUNITY
Start: 2019-01-10

## 2019-01-18 RX ORDER — OXYCODONE HYDROCHLORIDE 10 MG/1
10 TABLET ORAL
Qty: 120 TAB | Refills: 0 | Status: SHIPPED | OUTPATIENT
Start: 2019-01-18 | End: 2019-01-26

## 2019-01-18 RX ORDER — OXYCODONE AND ACETAMINOPHEN 5; 325 MG/1; MG/1
TABLET ORAL
OUTPATIENT
Start: 2019-01-18

## 2019-01-18 RX ORDER — METHYLPREDNISOLONE 4 MG/1
TABLET ORAL
Qty: 1 DOSE PACK | Refills: 1 | Status: SHIPPED | OUTPATIENT
Start: 2019-01-18 | End: 2019-02-05 | Stop reason: SDUPTHER

## 2019-01-18 NOTE — PROGRESS NOTES
HISTORY OF PRESENT ILLNESS Alie Gamino is a 80 y.o. male. Visit Vitals /74 (BP 1 Location: Left arm, BP Patient Position: Sitting) Pulse 75 Temp 97.9 °F (36.6 °C) (Oral) Resp 14 Ht 5' 8\" (1.727 m) Wt 155 lb (70.3 kg) SpO2 97% BMI 23.57 kg/m² Pt has long standing severe peripheral neuropathy. Is on lyrica and oxycodone to control the pain. Even with this he has pain in his feet that some days is a lot worse Hypertension The history is provided by the patient. This is a chronic problem. The current episode started more than 1 week ago. The problem has not changed since onset. There are no associated agents to hypertension. Risk factors include a sedentary lifestyle, male gender and hypertension. Medication Refill The history is provided by the patient. This is a new problem. Neurologic Problem The history is provided by the patient (chronic neuropathic pain). This is a chronic problem. The current episode started more than 1 week ago. The problem has not changed since onset. Review of Systems Constitutional: Negative for chills and fever. Musculoskeletal: Positive for joint pain (right wrist flare-up). Physical Exam  
Constitutional: He is oriented to person, place, and time. He appears well-developed and well-nourished. No distress. Cardiovascular: Normal rate and regular rhythm. Pulmonary/Chest: Effort normal and breath sounds normal.  
Musculoskeletal: He exhibits no edema. Right wrist + Finklestein test.   
Neurological: He is alert and oriented to person, place, and time. Skin: Skin is warm and dry. He is not diaphoretic. Psychiatric: He has a normal mood and affect. Nursing note and vitals reviewed. ASSESSMENT and PLAN 
  ICD-10-CM ICD-9-CM 1. Neuropathic pain M79.2 729.2 oxyCODONE IR (ROXICODONE) 10 mg tab immediate release tablet  
   oxyCODONE IR (ROXICODONE) 10 mg tab immediate release tablet oxyCODONE IR (ROXICODONE) 10 mg tab immediate release tablet 2. Right wrist pain M25.531 719.43 methylPREDNISolone (MEDROL DOSEPACK) 4 mg tablet AMB SUPPLY ORDER  
3. Encounter for long-term use of opiate analgesic Z79.891 V58.69 TOXASSURE SELECT 13 (MW) 4. De Quervain's tenosynovitis, right M65.4 727.04 AMB SUPPLY ORDER Update UDS Update pain contract  reviewed and appropriate activity noted. No adverse activity noted. Refills as noted Medrol and wrist splint F/u 2 weeks. May need referral to Dr. Parul Astorga

## 2019-01-18 NOTE — LETTER
AGREEMENT for controlled medication treatment Revasalas Johns, have agreed to a course of treatment that includes taking controlled medication. For this treatment I designate _____________________________________ as the [facility] Provider.  The purpose of this agreement is to prevent misunderstandings about controlled medications I may be taking for treatment, and to comply with applicable laws. I understand this agreement is essential to the trust and confidence necessary in a provider-patient relationship and that the designated provider will treat me in accordance with the statements below. As part of my treatment I agree to the followin.  USE 
a. I will take the controlled medication as instructed by the designated provider and avoid improper use of controlled medications. b.   I will not share, sell or trade controlled medication with anyone as this is a criminal offense.  
c.   I will not use any illegal controlled substance, including marijuana, cocaine, etc. as this is a criminal offense. 2.  PROVIDERS 
a. I will only obtain controlled medication from the designated provider. b.   I will not attempt to obtain the same or similar controlled medications, such as opioid pain medication, stimulants, anti-anxiety or hypnotics from any other provider as this is a criminal offense. 
c.   I will inform the designated provider about all other licensed professionals providing medical care to me and authorize communication between all providers to coordinate care, particularly prescribing or dispensing of controlled medications. 3.  PHARMACY 
a.   I will only fill controlled medication prescriptions at the approved pharmacy as listed below. 4.  OFFICE VISITS 
a. I agree to attend scheduled office visit appointments. b.   I am aware my office visits may be monthly or as determined necessary by the designated provider. c.   I will communicate fully with the designated provider about the character and intensity of my symptoms, the effect of the symptoms on my daily life, and how well the controlled medication is helping to relieve the cause of my symptoms. 5.  REFILLS OR CALL-IN PRESCRIPTIONS OF CONTROLLED MEDICATIONS 
a. I agree that refills of my prescriptions for controlled medications will be made at the time of an office visit during regular office hours. No refills will be available during evenings or on weekends. Abusive or inappropriate behavior related to medication refills will not be tolerated. b.   I will not call the office repeatedly to inquire about my controlled medication. I understand that medications will be written on the due date and not before. Calling the office repeatedly will be considered harassment, and I may be discharged from the practice. c.   Controlled medications may not be called in for refill, but doing so is at the discretion of the designated provider. d.   I am aware that only I must  prescriptions for controlled medications at the office but the designated provider may allow a designee to  the prescription from the office under very specific circumstance that may develop. 6.  TOXICOLOGY SCREENING 
a. I agree to random urine toxicology screenings in order to comply with government and [address] [place] [address] regulations. I understand that I will be financially responsible for any charges incurred for the urine toxicology screening, which may not be covered by my insurance. Failure to do so will be considered non-compliance and I may be discharged. b. In some cases an oral swab or hair sample may be substituted for a urine screen. This is at the discretion of the designated provider.   I understand that I will be financially responsible for any charges incurred as well. 
c.   I understand that if the urine toxicology does not show my medications prescribed to me by the designated provider, or it shows any illegal substance or any other medications NOT prescribed by the designated providers, I may be discharged from this practice at the discretion of the designated provider and no further controlled medications will be prescribed or follow-up appointments scheduled. Also, if any illegal substances are detected on the urine toxicology, this information may be provided to local law enforcement. 7. PILL COUNTS 
a. I am aware I may be called at any time to come into the office for a count of all my remaining controlled medications in order to help the designated provider understand the rate at which I use my controlled medications and to more effectively adjust dosage. b. I agree that I will use my medication at a rate NO greater than the prescribed rate and that use of my medication at a greater rate will result in my being without medication for the period of time until next expected due date. c. I will bring in the containers with the medication prescribed by all providers, including the designated provider, to each office visit even if there is no medication remaining. All controlled medication will be in the original containers from the pharmacy for each medication. Failure to do so will be considered non-compliance and I may be discharged from the practice. 8.  LOSS OR THEFT OF MEDICATION 
a. I will safeguard my controlled medication from loss or theft. b.   Lost or stolen controlled medication may not be replaced. This includes a prescription that has not yet been filled at the pharmacy. c.   In the event my controlled medications are stolen or lost, I will notify the designated providers office immediately. If such event occurs during the night, weekend or holiday, I will leave a detailed message on the answering machine or answering service at the number listed above. d.   I will file and produce an official police report for any effort to replace controlled medications prescribed. 9.  AGENCY COLLABORATION I authorize the designated provider and the authorized pharmacy/pharmacist to cooperate fully with any city, state, or federal law enforcement agency in the investigation of any possible misuse, sale or other diversion of my controlled medication. 10.  TREATMENT I understand that if I violate any of the conditions, my controlled medication and/or treatment will be terminated. If the violation involves obtaining controlled substances and/or dangerous drugs from another source, the incident may be reported to other medical facilities and authorities, including law enforcement. In this case, the designated provider may taper off the medication over a period of several days, as necessary, to avoid withdrawal symptoms or will suggest alternate treatment facilities. Also, a drug dependence treatment program may be recommended. 11.  AGREEMENT I agree to follow these guidelines that have been fully explained to me. All of my questions and concerns regarding treatment have been adequately answered. A copy of this document has been given to me. I agree to use ______________________________ Authorized Pharmacy located at _________________________________________________________________ Telephone ________________________________for filling ALL controlled medication prescriptions. This agreement is entered into on 1/18/2019 Patient signature__________________________________________________________ Legal Guardian signature___________________________________________________ Provider signature_________________________________________________________ Witness signature_________________________________________________________

## 2019-01-18 NOTE — PROGRESS NOTES
Patient presents to the clinic for a follow up on his blood pressure and diabetes. Patient would also like to get a refill of his medication.

## 2019-01-25 LAB — DRUGS UR: NORMAL

## 2019-02-05 ENCOUNTER — HOSPITAL ENCOUNTER (OUTPATIENT)
Dept: LAB | Age: 69
Discharge: HOME | End: 2019-02-05
Payer: MEDICARE

## 2019-02-05 ENCOUNTER — OFFICE VISIT (OUTPATIENT)
Dept: INTERNAL MEDICINE CLINIC | Age: 69
End: 2019-02-05

## 2019-02-05 VITALS
HEIGHT: 68 IN | HEART RATE: 62 BPM | DIASTOLIC BLOOD PRESSURE: 80 MMHG | BODY MASS INDEX: 24.28 KG/M2 | WEIGHT: 160.2 LBS | OXYGEN SATURATION: 96 % | SYSTOLIC BLOOD PRESSURE: 183 MMHG | TEMPERATURE: 98.1 F | RESPIRATION RATE: 12 BRPM

## 2019-02-05 DIAGNOSIS — M25.532 PAIN IN BOTH WRISTS: ICD-10-CM

## 2019-02-05 DIAGNOSIS — M79.2 NEUROPATHIC PAIN: ICD-10-CM

## 2019-02-05 DIAGNOSIS — M25.531 PAIN IN BOTH WRISTS: ICD-10-CM

## 2019-02-05 DIAGNOSIS — Z23 ENCOUNTER FOR IMMUNIZATION: ICD-10-CM

## 2019-02-05 DIAGNOSIS — M25.531 RIGHT WRIST PAIN: ICD-10-CM

## 2019-02-05 DIAGNOSIS — E11.40 TYPE 2 DIABETES MELLITUS WITH DIABETIC NEUROPATHY, WITHOUT LONG-TERM CURRENT USE OF INSULIN (HCC): Chronic | ICD-10-CM

## 2019-02-05 DIAGNOSIS — E11.40 TYPE 2 DIABETES MELLITUS WITH DIABETIC NEUROPATHY, WITHOUT LONG-TERM CURRENT USE OF INSULIN (HCC): Primary | Chronic | ICD-10-CM

## 2019-02-05 LAB
ANION GAP SERPL CALC-SCNC: 8 MMOL/L (ref 3–18)
BASOPHILS # BLD: 0 K/UL (ref 0–0.1)
BASOPHILS NFR BLD: 0 % (ref 0–2)
BUN SERPL-MCNC: 14 MG/DL (ref 7–18)
BUN/CREAT SERPL: 13 (ref 12–20)
CALCIUM SERPL-MCNC: 9.9 MG/DL (ref 8.5–10.1)
CHLORIDE SERPL-SCNC: 105 MMOL/L (ref 100–108)
CO2 SERPL-SCNC: 26 MMOL/L (ref 21–32)
CREAT SERPL-MCNC: 1.08 MG/DL (ref 0.6–1.3)
DIFFERENTIAL METHOD BLD: ABNORMAL
EOSINOPHIL # BLD: 0 K/UL (ref 0–0.4)
EOSINOPHIL NFR BLD: 0 % (ref 0–5)
ERYTHROCYTE [DISTWIDTH] IN BLOOD BY AUTOMATED COUNT: 15.6 % (ref 11.6–14.5)
GLUCOSE SERPL-MCNC: 113 MG/DL (ref 74–99)
HCT VFR BLD AUTO: 43.7 % (ref 36–48)
HGB BLD-MCNC: 14.2 G/DL (ref 13–16)
LYMPHOCYTES # BLD: 1.3 K/UL (ref 0.9–3.6)
LYMPHOCYTES NFR BLD: 14 % (ref 21–52)
MCH RBC QN AUTO: 30.6 PG (ref 24–34)
MCHC RBC AUTO-ENTMCNC: 32.5 G/DL (ref 31–37)
MCV RBC AUTO: 94.2 FL (ref 74–97)
MONOCYTES # BLD: 0.6 K/UL (ref 0.05–1.2)
MONOCYTES NFR BLD: 7 % (ref 3–10)
NEUTS SEG # BLD: 7.3 K/UL (ref 1.8–8)
NEUTS SEG NFR BLD: 79 % (ref 40–73)
PLATELET # BLD AUTO: 335 K/UL (ref 135–420)
PMV BLD AUTO: 10.6 FL (ref 9.2–11.8)
POTASSIUM SERPL-SCNC: 4.2 MMOL/L (ref 3.5–5.5)
RBC # BLD AUTO: 4.64 M/UL (ref 4.7–5.5)
SODIUM SERPL-SCNC: 139 MMOL/L (ref 136–145)
URATE SERPL-MCNC: 3.3 MG/DL (ref 2.6–7.2)
WBC # BLD AUTO: 9.2 K/UL (ref 4.6–13.2)

## 2019-02-05 PROCEDURE — 84550 ASSAY OF BLOOD/URIC ACID: CPT

## 2019-02-05 PROCEDURE — 85025 COMPLETE CBC W/AUTO DIFF WBC: CPT

## 2019-02-05 PROCEDURE — 83036 HEMOGLOBIN GLYCOSYLATED A1C: CPT

## 2019-02-05 PROCEDURE — 36415 COLL VENOUS BLD VENIPUNCTURE: CPT

## 2019-02-05 PROCEDURE — 80048 BASIC METABOLIC PNL TOTAL CA: CPT

## 2019-02-05 RX ORDER — METHYLPREDNISOLONE 4 MG/1
TABLET ORAL
Qty: 1 DOSE PACK | Refills: 1 | Status: SHIPPED | OUTPATIENT
Start: 2019-02-05 | End: 2019-04-18

## 2019-02-05 NOTE — PROGRESS NOTES
HISTORY OF PRESENT ILLNESS  Lacie Zabala is a 80 y.o. male. Visit Vitals  /80   Pulse 62   Temp 98.1 °F (36.7 °C) (Oral)   Resp 12   Ht 5' 8\" (1.727 m)   Wt 160 lb 3.2 oz (72.7 kg)   SpO2 96%   BMI 24.36 kg/m²       Here to discuss his + drug test.  He says he had been using some cough medication with codeine. But he did not provide any proof of this. He does use marijuana a couple of times a weeks. He has tried CBD oil and says it does not agree with him. Current Outpatient Medications:  DULoxetine (CYMBALTA) 30 mg capsule,   methylPREDNISolone (MEDROL DOSEPACK) 4 mg tablet, Per dose pack instructions  LYRICA 150 mg capsule, take 1 capsule by mouth twice a day  losartan (COZAAR) 100 mg tablet, take 1 tablet by mouth once daily  albuterol (VENTOLIN HFA) 90 mcg/actuation inhaler, 2 Puffs. oxyCODONE-acetaminophen (PERCOCET) 5-325 mg per tablet, Take 1 Tab by Mouth Every 4 Hours As Needed for Pain. Do not exceed 4000 mg of acetaminophen per day. ANORO ELLIPTA 62.5-25 mcg/actuation inhaler,   umeclidinium-vilanterol (ANORO ELLIPTA) 62.5-25 mcg/actuation inhaler, 1 Puff.  simvastatin (ZOCOR) 40 mg tablet, Take  by mouth nightly. sildenafil citrate (VIAGRA) 100 mg tablet, Take 1 Tab by mouth daily as needed. umeclidinium (INCRUSE ELLIPTA) 62.5 mcg/actuation inhaler, Take 1 Puff by inhalation daily. meloxicam (MOBIC) 15 mg tablet,   amLODIPine (NORVASC) 5 mg tablet, Take 1 Tab by mouth daily. metFORMIN (GLUCOPHAGE) 500 mg tablet, Take 1 Tab by mouth daily (with breakfast). Indications: type 2 diabetes mellitus  therapeutic multivitamin-minerals (THERAGRAN-M) tablet, Take 1 Tab by Mouth Once a Day. aspirin 81 mg chewable tablet, 81 mg. No current facility-administered medications for this visit. Wrist Pain    The history is provided by the patient (now in left wrist. Has known gout.). This is a new problem. The current episode started more than 1 week ago. The problem occurs daily. Diabetes   The history is provided by the patient. This is a chronic problem. The current episode started more than 1 week ago. The problem occurs daily. The problem has not changed since onset. Exacerbated by: diet. Relieved by: diet. Review of Systems   Constitutional: Negative for chills and fever. Genitourinary: Negative for frequency and urgency. Musculoskeletal: Positive for joint pain. Falls: wrist pain. Endo/Heme/Allergies: Negative for polydipsia. Physical Exam   Constitutional: He is oriented to person, place, and time. He appears well-developed and well-nourished. No distress. Cardiovascular: Normal rate and regular rhythm. Pulmonary/Chest: Effort normal and breath sounds normal.   Musculoskeletal: He exhibits no edema. Left wrist pain at base of thumb   Neurological: He is alert and oriented to person, place, and time. Skin: Skin is warm and dry. He is not diaphoretic. Psychiatric: He has a normal mood and affect. Nursing note and vitals reviewed. ASSESSMENT and PLAN    ICD-10-CM ICD-9-CM    1. Type 2 diabetes mellitus with diabetic neuropathy, without long-term current use of insulin ([facility]) E11.40 250.60 HEMOGLOBIN A1C W/O EAG     679.9 METABOLIC PANEL, BASIC   2. Pain in both wrists M25.531 719.43 CBC WITH AUTOMATED DIFF    M25.532  URIC ACID      methylPREDNISolone (MEDROL DOSEPACK) 4 mg tablet   3. Neuropathic pain M79.2 729.2    4. Right wrist pain M25.531 719.43    5. Encounter for immunization Z23 V03.89 INFLUENZA VACCINE INACTIVATED (IIV), SUBUNIT, ADJUVANTED, IM      ADMIN INFLUENZA VIRUS VAC       Long discussion with pt re his + drug test. Will give benefit of doubt re codeine cough syrup. But he MUST tell us of anything else he may take--and provide proof. Pain in wrists. ? Will check uric acid level.  His father had gout  Pulse with medrol again--watch blood sugar    Update diabetic lab    BP up today    F/u 4 months for HTN, DM

## 2019-02-05 NOTE — PROGRESS NOTES
Roopa Weiner is a 80 y.o. male (: 1950) presenting to address:    Chief Complaint   Patient presents with    Wrist Pain     Pt states the pain is the same and is experiencing pain bilaterally       Vitals:    19 1016   BP: 185/72   Pulse: 62   Resp: 12   Temp: 98.1 °F (36.7 °C)   TempSrc: Oral   SpO2: 96%   Weight: 160 lb 3.2 oz (72.7 kg)   Height: 5' 8\" (1.727 m)   PainSc:   8   PainLoc: Wrist       Hearing/Vision:   No exam data present    Learning Assessment:     Learning Assessment 7/10/2014   PRIMARY LEARNER Patient   HIGHEST LEVEL OF EDUCATION - PRIMARY LEARNER  GRADUATED HIGH SCHOOL OR GED   BARRIERS PRIMARY LEARNER NONE   CO-LEARNER CAREGIVER No   PRIMARY LANGUAGE ENGLISH   LEARNER PREFERENCE PRIMARY DEMONSTRATION   ANSWERED BY self   RELATIONSHIP SELF     Depression Screening:     PHQ over the last two weeks 2019   Little interest or pleasure in doing things Not at all   Feeling down, depressed, irritable, or hopeless Not at all   Total Score PHQ 2 0     Fall Risk Assessment:     Fall Risk Assessment, last 12 mths 2019   Able to walk? Yes   Fall in past 12 months? No     Abuse Screening:     Abuse Screening Questionnaire 2018   Do you ever feel afraid of your partner? N   Are you in a relationship with someone who physically or mentally threatens you? N   Is it safe for you to go home? Y     Coordination of Care Questionaire:   1. Have you been to the ER, urgent care clinic since your last visit? Hospitalized since your last visit? NO    2. Have you seen or consulted any other health care providers outside of the [address] [place] [address] since your last visit? Include any pap smears or colon screening. NO    Advanced Directive:   1. Do you have an Advanced Directive? YES    2. Would you like information on Advanced Directives?  NO

## 2019-02-06 LAB — HBA1C MFR BLD: 5.7 % (ref 4.2–5.6)

## 2019-03-01 RX ORDER — LOSARTAN POTASSIUM 100 MG/1
TABLET ORAL
Qty: 90 TAB | Refills: 1 | Status: SHIPPED | OUTPATIENT
Start: 2019-03-01 | End: 2019-04-18 | Stop reason: SDUPTHER

## 2019-03-13 DIAGNOSIS — I10 ESSENTIAL HYPERTENSION: Chronic | ICD-10-CM

## 2019-03-13 RX ORDER — AMLODIPINE BESYLATE 5 MG/1
TABLET ORAL
Qty: 30 TAB | Refills: 5 | Status: SHIPPED | OUTPATIENT
Start: 2019-03-13 | End: 2019-04-18 | Stop reason: SDUPTHER

## 2019-04-18 ENCOUNTER — OFFICE VISIT (OUTPATIENT)
Dept: INTERNAL MEDICINE CLINIC | Age: 69
End: 2019-04-18

## 2019-04-18 VITALS
WEIGHT: 157 LBS | RESPIRATION RATE: 18 BRPM | HEIGHT: 68 IN | HEART RATE: 68 BPM | BODY MASS INDEX: 23.79 KG/M2 | OXYGEN SATURATION: 98 % | SYSTOLIC BLOOD PRESSURE: 137 MMHG | DIASTOLIC BLOOD PRESSURE: 71 MMHG | TEMPERATURE: 96 F

## 2019-04-18 DIAGNOSIS — M79.642 PAIN OF LEFT HAND: ICD-10-CM

## 2019-04-18 DIAGNOSIS — E11.40 TYPE 2 DIABETES MELLITUS WITH DIABETIC NEUROPATHY, WITHOUT LONG-TERM CURRENT USE OF INSULIN (HCC): ICD-10-CM

## 2019-04-18 DIAGNOSIS — M25.532 LEFT WRIST PAIN: Primary | ICD-10-CM

## 2019-04-18 DIAGNOSIS — Z76.0 MEDICATION REFILL: ICD-10-CM

## 2019-04-18 DIAGNOSIS — I10 ESSENTIAL HYPERTENSION: Chronic | ICD-10-CM

## 2019-04-18 RX ORDER — AMLODIPINE BESYLATE 5 MG/1
5 TABLET ORAL DAILY
Qty: 90 TAB | Refills: 4 | Status: SHIPPED | OUTPATIENT
Start: 2019-04-18

## 2019-04-18 RX ORDER — METFORMIN HYDROCHLORIDE 500 MG/1
500 TABLET ORAL
Qty: 180 TAB | Refills: 4 | Status: SHIPPED | OUTPATIENT
Start: 2019-04-18

## 2019-04-18 RX ORDER — LOSARTAN POTASSIUM 100 MG/1
100 TABLET ORAL DAILY
Qty: 90 TAB | Refills: 4 | Status: SHIPPED | OUTPATIENT
Start: 2019-04-18 | End: 2020-06-29

## 2019-04-18 RX ORDER — SIMVASTATIN 40 MG/1
40 TABLET, FILM COATED ORAL
Qty: 90 TAB | Refills: 4 | Status: SHIPPED | OUTPATIENT
Start: 2019-04-18

## 2019-04-18 RX ORDER — OXYCODONE AND ACETAMINOPHEN 5; 325 MG/1; MG/1
1 TABLET ORAL
Qty: 30 TAB | Refills: 0 | Status: SHIPPED | OUTPATIENT
Start: 2019-04-18 | End: 2019-04-25

## 2019-04-18 RX ORDER — UMECLIDINIUM BROMIDE AND VILANTEROL TRIFENATATE 62.5; 25 UG/1; UG/1
1 POWDER RESPIRATORY (INHALATION) DAILY
Qty: 3 INHALER | Refills: 4 | Status: SHIPPED | OUTPATIENT
Start: 2019-04-18

## 2019-04-18 NOTE — PROGRESS NOTES
ROOM # 6    Taylor Mcmullen presents today for   Chief Complaint   Patient presents with    Wrist Pain     left        Taylor Benedict preferred language for health care discussion is english/other. Is someone accompanying this pt? no    Is the patient using any DME equipment during OV? no    Depression Screening:  3 most recent PHQ Screens 1/18/2019 10/18/2018 5/30/2018 11/10/2017 6/22/2017 2/6/2017 7/11/2016   Little interest or pleasure in doing things Not at all Not at all Several days More than half the days Not at all Not at all Not at all   Feeling down, depressed, irritable, or hopeless Not at all Not at all Several days More than half the days Not at all Not at all Not at all   Total Score PHQ 2 0 0 2 4 0 0 0       Learning Assessment:  Learning Assessment 7/10/2014   PRIMARY LEARNER Patient   HIGHEST LEVEL OF EDUCATION - PRIMARY LEARNER  GRADUATED HIGH SCHOOL OR GED   BARRIERS PRIMARY LEARNER NONE   CO-LEARNER CAREGIVER No   PRIMARY LANGUAGE ENGLISH   LEARNER PREFERENCE PRIMARY DEMONSTRATION   ANSWERED BY self   RELATIONSHIP SELF       Abuse Screening:  Abuse Screening Questionnaire 5/30/2018 11/10/2017 5/12/2015   Do you ever feel afraid of your partner? N N N   Are you in a relationship with someone who physically or mentally threatens you? N N N   Is it safe for you to go home? Thiago Helm       Fall Risk  Fall Risk Assessment, last 12 mths 1/18/2019 10/18/2018 9/6/2018 7/26/2018 6/20/2018 5/30/2018 11/10/2017   Able to walk? Yes Yes Yes Yes Yes Yes Yes   Fall in past 12 months? No No No No No No No       Health Maintenance reviewed and discussed per provider. Yes    Taylor Mcmullen is due for   Health Maintenance Due   Topic Date Due    DTaP/Tdap/Td series (1 - Tdap) 03/03/1972    Shingrix Vaccine Age 50> (1 of 2) 03/03/2001    AAA Screening 73-69 YO Male Smoking Patients  03/03/2016     Please order/place referral if appropriate. Advance Directive:  1. Do you have an advance directive in place? Patient Reply: yes    2. If not, would you like material regarding how to put one in place? Patient Reply: no    Coordination of Care:  1. Have you been to the ER, urgent care clinic since your last visit? Hospitalized since your last visit? no    2. Have you seen or consulted any other health care providers outside of the [address] [place] [address] since your last visit? Include any pap smears or colon screening.  No

## 2019-04-18 NOTE — PROGRESS NOTES
HISTORY OF PRESENT ILLNESS  Gris Garcia is a 77 y.o. male. Visit Vitals  /71 (BP 1 Location: Left arm, BP Patient Position: Sitting)   Pulse 68   Temp 96 °F (35.6 °C) (Oral)   Resp 18   Ht 5' 8\" (1.727 m)   Wt 157 lb (71.2 kg)   SpO2 98%   BMI 23.87 kg/m²       Wrist Pain    The history is provided by the patient. This is a chronic problem. The current episode started more than 1 week ago. The problem occurs daily. The problem has been gradually worsening. The pain is present in the left wrist.       Review of Systems   Constitutional: Negative for chills and fever. Musculoskeletal: Joint pain: left wrist and (right shoulder.)       Physical Exam   Constitutional: He is oriented to person, place, and time. He appears well-developed and well-nourished. No distress. Cardiovascular: Normal rate. Pulmonary/Chest: Effort normal.   Musculoskeletal:   Left wrist with some pain at base of thumb. No redness or swelling  Some decreased hand    Neurological: He is alert and oriented to person, place, and time. Skin: Skin is warm and dry. He is not diaphoretic. Psychiatric: He has a normal mood and affect. Nursing note and vitals reviewed. ASSESSMENT and PLAN    ICD-10-CM ICD-9-CM    1. Left wrist pain M25.532 719.43 XR WRIST LT AP/LAT      REFERRAL TO ORTHOPEDICS      oxyCODONE-acetaminophen (PERCOCET) 5-325 mg per tablet   2. Pain of left hand M79.642 729.5 XR HAND LT AP/LAT      REFERRAL TO ORTHOPEDICS      oxyCODONE-acetaminophen (PERCOCET) 5-325 mg per tablet   3. Type 2 diabetes mellitus with diabetic neuropathy, without long-term current use of insulin (HCC) E11.40 250.60      357.2    4. Essential hypertension I10 401.9    5.  Medication refill Z76.0 V68.1 amLODIPine (NORVASC) 5 mg tablet      ANORO ELLIPTA 62.5-25 mcg/actuation inhaler      losartan (COZAAR) 100 mg tablet      metFORMIN (GLUCOPHAGE) 500 mg tablet      simvastatin (ZOCOR) 40 mg tablet         Wrist pain--will xray and refer    BP controlled    DM controlled--not due for lab yet    F/u here 2 months for DM. HTN.      Can stay off metformin for now as home readings have been so good (off metformin for a month)

## 2020-06-29 DIAGNOSIS — Z76.0 MEDICATION REFILL: ICD-10-CM

## 2020-06-29 RX ORDER — LOSARTAN POTASSIUM 100 MG/1
TABLET ORAL
Qty: 90 TAB | Refills: 4 | Status: SHIPPED | OUTPATIENT
Start: 2020-06-29

## 2020-06-29 RX ORDER — ALBUTEROL SULFATE 90 UG/1
2 AEROSOL, METERED RESPIRATORY (INHALATION)
Qty: 1 INHALER | Refills: 5 | Status: SHIPPED | OUTPATIENT
Start: 2020-06-29

## 2020-06-29 NOTE — TELEPHONE ENCOUNTER
Pharmacy call to refill medication. Last OV 19. Requested Prescriptions     Pending Prescriptions Disp Refills    albuterol (Ventolin HFA) 90 mcg/actuation inhaler 1 Inhaler      Si Puffs.

## 2022-03-18 PROBLEM — E11.40 TYPE 2 DIABETES MELLITUS WITH DIABETIC NEUROPATHY (HCC): Status: ACTIVE | Noted: 2018-01-09

## 2022-03-18 PROBLEM — I73.9 PAD (PERIPHERAL ARTERY DISEASE) (HCC): Status: ACTIVE | Noted: 2018-07-26

## 2022-03-18 PROBLEM — M25.531 PAIN IN BOTH WRISTS: Status: ACTIVE | Noted: 2019-02-05

## 2022-03-18 PROBLEM — R10.9 ABDOMINAL PAIN: Status: ACTIVE | Noted: 2018-04-30

## 2022-03-18 PROBLEM — R55 SYNCOPE AND COLLAPSE: Status: ACTIVE | Noted: 2017-01-15

## 2022-03-18 PROBLEM — M25.532 PAIN IN BOTH WRISTS: Status: ACTIVE | Noted: 2019-02-05

## 2022-03-18 PROBLEM — I16.0 HYPERTENSIVE URGENCY: Status: ACTIVE | Noted: 2018-04-30

## 2022-03-19 PROBLEM — Z79.891 LONG TERM (CURRENT) USE OF OPIATE ANALGESIC: Status: ACTIVE | Noted: 2018-07-26

## 2022-03-19 PROBLEM — I10 ESSENTIAL HYPERTENSION: Status: ACTIVE | Noted: 2018-02-06

## 2022-03-19 PROBLEM — K29.60 EROSIVE GASTRITIS: Status: ACTIVE | Noted: 2017-01-15

## 2022-03-19 PROBLEM — M79.2 NEUROPATHIC PAIN: Status: ACTIVE | Noted: 2019-02-05

## 2022-03-19 PROBLEM — M79.605 PAIN OF LEFT LOWER EXTREMITY: Status: ACTIVE | Noted: 2018-03-20

## 2022-03-19 PROBLEM — T82.7XXA INFECTED AORTIC GRAFT (HCC): Status: ACTIVE | Noted: 2018-04-30

## 2022-03-19 PROBLEM — I77.1 ARTERIAL INSUFFICIENCY (HCC): Status: ACTIVE | Noted: 2017-10-21

## 2022-03-19 PROBLEM — I70.229 CRITICAL LOWER LIMB ISCHEMIA (HCC): Status: ACTIVE | Noted: 2018-03-31

## 2022-03-20 PROBLEM — Z01.818 PRE-OP EVALUATION: Status: ACTIVE | Noted: 2018-06-07

## 2023-05-24 RX ORDER — SILDENAFIL 100 MG/1
100 TABLET, FILM COATED ORAL DAILY PRN
COMMUNITY
Start: 2018-10-26

## 2023-05-24 RX ORDER — LOSARTAN POTASSIUM 100 MG/1
1 TABLET ORAL DAILY
COMMUNITY
Start: 2020-06-29

## 2023-05-24 RX ORDER — SIMVASTATIN 40 MG
40 TABLET ORAL
COMMUNITY
Start: 2019-04-18

## 2023-05-24 RX ORDER — AMLODIPINE BESYLATE 5 MG/1
5 TABLET ORAL DAILY
COMMUNITY
Start: 2019-04-18

## 2023-05-24 RX ORDER — UMECLIDINIUM BROMIDE AND VILANTEROL TRIFENATATE 62.5; 25 UG/1; UG/1
1 POWDER RESPIRATORY (INHALATION) DAILY
COMMUNITY
Start: 2019-04-18

## 2023-05-24 RX ORDER — MELOXICAM 15 MG/1
TABLET ORAL
COMMUNITY
Start: 2018-05-30

## 2023-05-24 RX ORDER — PREGABALIN 150 MG/1
1 CAPSULE ORAL 2 TIMES DAILY
COMMUNITY
Start: 2019-01-08

## 2023-05-24 RX ORDER — ASPIRIN 81 MG/1
81 TABLET, CHEWABLE ORAL
COMMUNITY
Start: 2017-01-20

## 2023-05-24 RX ORDER — ALBUTEROL SULFATE 90 UG/1
2 AEROSOL, METERED RESPIRATORY (INHALATION) EVERY 4 HOURS PRN
COMMUNITY
Start: 2020-06-29

## 2023-05-24 RX ORDER — DULOXETIN HYDROCHLORIDE 30 MG/1
CAPSULE, DELAYED RELEASE ORAL
COMMUNITY
Start: 2019-01-10

## 2023-06-26 ENCOUNTER — TELEPHONE (OUTPATIENT)
Facility: CLINIC | Age: 73
End: 2023-06-26

## 2023-07-10 NOTE — TELEPHONE ENCOUNTER
Called pt and unable to leave message. The call was to inform pt of the PCP's inquiring Re: scheduling an appt. Letter sent.

## 2023-08-04 ENCOUNTER — TELEPHONE (OUTPATIENT)
Facility: CLINIC | Age: 73
End: 2023-08-04

## 2023-08-04 NOTE — TELEPHONE ENCOUNTER
Patient's sister Yoshi Helm, (Patient was also on the home) called in to schedule a hospital/post cardiac surgery appt. Advised since he has not been seen since 2019 he would be considered a new patient and I would have to ask if you are willing to see the patient. Patient's sister also states the patient is out of BP medication and he needs a refill. Advised you can not fill his medication as you have not seen him in over 3yrs,  Ms. Gómez Garcia is asking to speak with you. Mr. Brandy Felder [phone]  Ms.  Gómez Garcia [phone]

## 2023-12-05 NOTE — PROGRESS NOTES
HISTORY OF PRESENT ILLNESS  Johanny Art is a 78 y.o. male. Syncope   The history is provided by the patient. This is a chronic problem. The current episode started more than 1 week ago. The problem has been gradually worsening. Pertinent negatives include no chest pain, no abdominal pain, no headaches and no shortness of breath. Patient  With an extensive neurological history presents with intermittent LOC and multiple TIA since 2014, states he has had increasing syncopal episodes lately, states he had two episodes this week, last episode yesterday\"after [employer]aker\". Patient denies any recent head injury,using illegal drugs or using alcohol. Patient denies any HA,blurry vision, unilateral weakness, slurred speech,facial drooling,drooping, NV,numbness,tingling,palpitations, malaise,SOB,CP. Patient not a good historian. Negative [place]. Patient BS WNL and EKG consistent with old EKG on file. Patient was recently admitted at [facility] with a full neuro work up including CTA neck and head. (see care everywhere notes) . Referred patient to ED for a full neuro work up. Review of Systems   Constitutional: Positive for weight loss. HENT: Negative. Eyes: Negative. Respiratory: Negative. Negative for shortness of breath. Cardiovascular: Positive for syncope. Negative for chest pain and palpitations. Gastrointestinal: Negative. Negative for abdominal pain. Genitourinary: Negative. Musculoskeletal: Negative. Skin: Negative. Neurological: Positive for loss of consciousness. Negative for headaches. Physical Exam   Constitutional: He is oriented to person, place, and time. He appears well-developed. HENT:   Head: Normocephalic. Neck: Normal range of motion. Cardiovascular: Normal rate. Pulmonary/Chest: Effort normal.   Abdominal: Soft. Musculoskeletal: Normal range of motion. Neurological: He is alert and oriented to person, place, and time. He has normal strength.  He displays a negative Romberg sign. GCS eye subscore is 4. GCS verbal subscore is 5. GCS motor subscore is 6. Skin: Skin is warm. Psychiatric: He has a normal mood and affect. His speech is normal and behavior is normal. Judgment and thought content normal. Cognition and memory are normal.       ASSESSMENT and PLAN    ICD-10-CM ICD-9-CM    1. Dizziness R42 780.4 AMB POC EKG ROUTINE W/ 12 LEADS, INTER & REP      AMB POC GLUCOSE BLOOD, BY GLUCOSE MONITORING DEVICE      REFERRAL TO EMERGENCY DEPARTMENT   2. Loss of consciousness ([address] R40.20 780.09 REFERRAL TO EMERGENCY DEPARTMENT   3. Controlled type 2 diabetes mellitus without complication, without long-term current use of insulin (HCC) E11.9 250.00 AMB POC GLUCOSE BLOOD, BY GLUCOSE MONITORING DEVICE   4. Abnormal weight loss R63.4 783.21 REFERRAL TO EMERGENCY DEPARTMENT     Encounter Diagnoses   Name Primary?  Dizziness Yes    Loss of consciousness ([address]     Controlled type 2 diabetes mellitus without complication, without long-term current use of insulin (HCC)     Abnormal weight loss      Orders Placed This Encounter    REFERRAL TO EMERGENCY DEPARTMENT    AMB POC GLUCOSE BLOOD, BY GLUCOSE MONITORING DEVICE    AMB POC EKG ROUTINE W/ 12 LEADS, INTER & REP     Orders Placed This Encounter    REFERRAL TO EMERGENCY DEPARTMENT     Referral Priority:   Urgent     Referral Type:   Consultation     Referral Reason:   Specialty Services Required    AMB POC GLUCOSE BLOOD, BY GLUCOSE MONITORING DEVICE    AMB POC EKG ROUTINE W/ 12 LEADS, INTER & REP     Order Specific Question:   Reason for Exam:     Answer:   Dizziness     Orders Placed This Encounter    REFERRAL TO EMERGENCY DEPARTMENT    AMB POC GLUCOSE BLOOD, BY GLUCOSE MONITORING DEVICE    AMB POC EKG ROUTINE W/ 12 LEADS, INTER & REP     Krista Newman was seen today for syncope.     Diagnoses and all orders for this visit:    Dizziness  -     AMB POC EKG ROUTINE W/ 12 LEADS, INTER & REP  -     AMB POC GLUCOSE BLOOD, BY GLUCOSE MONITORING DEVICE  -     REFERRAL TO EMERGENCY DEPARTMENT    Loss of consciousness ([facility] [address]  -     REFERRAL TO EMERGENCY DEPARTMENT    Controlled type 2 diabetes mellitus without complication, without long-term current use of insulin ([facility])  -     AMB POC GLUCOSE BLOOD, BY GLUCOSE MONITORING DEVICE    Abnormal weight loss  -     REFERRAL TO EMERGENCY DEPARTMENT    Other orders  -     Cancel: CBC WITH AUTOMATED DIFF; Future  -     Cancel: METABOLIC PANEL, COMPREHENSIVE; Future      Follow-up Disposition:  Return if symptoms worsen or fail to improve.   current treatment plan is effective, no change in therapy  the following changes in treatment are made: Referral to ED Detail Level: Detailed

## 2025-05-13 NOTE — ED PROVIDER NOTES
HPI Comments: Trevor Sharma is a 80 y.o. Male with known h/o pvd, previous lle vascular surgery folllowed by [facility] vascular specialists with c/o abrupt onset of left lower leg pain while he was walking about 30 min prior to arrival. No fall, injury. Pain is severe throbbing worse with any movement. Foot feels numb, is able to move toes. Called ems who brought him here. Nothing taken prior to arrival    The history is provided by the patient and medical records.         Past Medical History:   Diagnosis Date    Allergic rhinitis due to other allergen     Arthritis     multiple joints    Bile-induced gastritis 01/16/2017    by EGD    Bundle branch block, unspecified 12/28/09    incomplete right    Chronic airway obstruction, not elsewhere classified 6/24/09    Colon polyps 9/25/14    tubular adenoma    Diabetes ([facility] [address] 2007    due to steroid for breathing/COPD    Hiatal hernia 01/16/2017    by EGD    Hypercholesterolemia 2-3 yrs    Hypertension 2007    Osteopenia     Peripheral neuropathy     RA (rheumatoid arthritis) ([facility] [address] 2005    Dr. Stacey Matos cuff tear     bilat    Stroke [facility]) June 2014    TIA    Tonsillar enlargement 5/14    palatine    Torn meniscus     right, lateral       Past Surgical History:   Procedure Laterality Date    HX ANGIOPLASTY Left 12/08/2017    LLE, occluded popliteal,     HX COLONOSCOPY  9/25/14    Dr. Leroy Brothers    HX COLONOSCOPY  01/18/2017    in hospital    HX ENDOSCOPY  01/16/2017    EGD, Dr. Courtney Grullon HX ENDOSCOPY  01/18/2017    at [address] [place] [address]  2010    bilaterally , Dr. 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 Review of Systems   Constitutional: Negative for fever. HENT: Negative for sore throat. Eyes: Negative for visual disturbance. Respiratory: Negative for cough and shortness of breath. Cardiovascular: Negative for leg swelling. Gastrointestinal: Negative for abdominal pain. Endocrine: Negative for polyuria. Genitourinary: Negative for difficulty urinating. Musculoskeletal: Positive for gait problem and myalgias. Skin: Positive for wound. Allergic/Immunologic: Negative for immunocompromised state. Neurological: Positive for numbness. Psychiatric/Behavioral: Positive for sleep disturbance. Vitals:    03/28/18 2051   BP: 156/85   Pulse: 93   Resp: 20   Temp: 98.1 °F (36.7 °C)   SpO2: 100%   Weight: 65.8 kg (145 lb)   Height: 5' 8\" (1.727 m)            Physical Exam   Constitutional: He is oriented to person, place, and time. Non-toxic appearance. He does not appear ill. He appears distressed. HENT:   Head: Normocephalic and atraumatic. Right Ear: External ear normal.   Left Ear: External ear normal.   Nose: Nose normal.   Mouth/Throat: Oropharynx is clear and moist. No oropharyngeal exudate. Eyes: Conjunctivae are normal.   Neck: Normal range of motion. Cardiovascular: Normal rate, regular rhythm and normal heart sounds. No doppler pulses below left knee. Nl left femoral pulses     Pulmonary/Chest: Effort normal and breath sounds normal. No respiratory distress. Abdominal: Soft. There is no tenderness. Musculoskeletal: Normal range of motion. He exhibits no edema. Left leg cool to touch below knee   Neurological: He is alert and oriented to person, place, and time. Skin: Skin is warm and dry. He is not diaphoretic. Psychiatric: His behavior is normal.   Nursing note and vitals reviewed. [facility]      ED Course       Procedures    Vitals:  Patient Vitals for the past 12 hrs:   Temp Pulse Resp BP SpO2   03/28/18 2051 98.1 °F (36.7 °C) 93 20 156/85 100 %         Medications ordered:   Medications   0.9% sodium chloride infusion (100 mL/hr IntraVENous New Bag 3/28/18 2110)   heparin 25,000 units in D5W 250 ml infusion (18 Units/kg/hr × 65.8 kg IntraVENous New Bag 3/28/18 2110)   morphine injection 4 mg (4 mg IntraVENous Given 3/28/18 2103)   ondansetron (ZOFRAN) injection 4 mg (4 mg IntraVENous Given 3/28/18 2103)   heparin (porcine) 1,000 unit/mL injection 5,260 Units (5,260 Units IntraVENous Given 3/28/18 2122)         Lab findings:  Recent Results (from the past 12 hour(s))   EKG, 12 LEAD, INITIAL    Collection Time: 03/28/18  9:24 PM   Result Value Ref Range    Ventricular Rate 100 BPM    Atrial Rate 100 BPM    P-R Interval 132 ms    QRS Duration 132 ms    Q-T Interval 376 ms    QTC Calculation (Bezet) 485 ms    Calculated P Axis 138 degrees    Calculated R Axis 86 degrees    Calculated T Axis 35 degrees    Diagnosis       Unusual P axis, possible ectopic atrial rhythm  Right bundle branch block  Lateral infarct , age undetermined  Abnormal ECG  No previous ECGs available         EKG interpretation by ED Physician:  Sinus with rbbb.  tw inversions laterally  Rate 100, pr 132, qtc 485  No previous  Similar to sentara  X-Ray, CT or other radiology findings or impressions:  No orders to display       Progress notes, Consult notes or additional Procedure notes:   Pt with acute arterial occlusion. D/w Dr Murcia Nine on for [facility] vascular specialist who will accept at [facility] for possible intervention  req I give heparin bolus then drip, keep npo  D/w pt regarding need for transfer and is accepting of plan    ED Critical Care Note    System at risk for life threatening failure: cardiac, renal, vascular  Associated problems: hypertension, tachycardia, limb ischemia    Critical Care services provided: bedside management, consult, documetnation  Excluded procedures (time not included in critical care): ecg interp    Total Critical Care Time (in minutes) 37          Reevaluation of patient:   stable    Disposition:  Diagnosis:   1. Ischemic leg        Disposition: transfer to [facility]    Follow-up Information     None            Patient's Medications   Start Taking    No medications on file   Continue Taking    ALBUTEROL (PROVENTIL HFA, VENTOLIN HFA, PROAIR HFA) 90 MCG/ACTUATION INHALER    Take 1 Puff by inhalation every six (6) hours as needed for Wheezing. ASPIRIN 81 MG CHEWABLE TABLET    81 mg. ATORVASTATIN (LIPITOR) 40 MG TABLET    Take 1 Tab by mouth daily. CAPSAICIN 0.075 % TOPICAL CREAM    Apply  to affected area three (3) times daily. FLUTICASONE (FLONASE) 50 MCG/ACTUATION NASAL SPRAY    2 Sprays by Both Nostrils route daily. Indications: ALLERGIC RHINITIS    FOLIC ACID (FOLVITE) 1 MG TABLET    1 mg. LOSARTAN (COZAAR) 100 MG TABLET    Take 1 Tab by mouth daily. Indications: hypertension    METFORMIN (GLUCOPHAGE) 500 MG TABLET    Take 1 Tab by mouth two (2) times daily (with meals). Indications: type 2 diabetes mellitus    METOPROLOL SUCCINATE (TOPROL-XL) 50 MG XL TABLET    Take 1 Tab by mouth daily. Indications: hypertension    NORTRIPTYLINE (PAMELOR) 50 MG CAPSULE    Take 1 Cap by mouth nightly. Indications: neuropathic pain    OMEPRAZOLE (PRILOSEC) 20 MG CAPSULE    20 mg.     OXYCODONE-ACETAMINOPHEN (PERCOCET) 5-325 MG PER TABLET    Take 1 Tab by mouth every six (6) hours as needed for Pain. Max Daily Amount: 4 Tabs. Indications: Pain    PREGABALIN (LYRICA) 150 MG CAPSULE    Take 1 Cap by mouth two (2) times a day. Max Daily Amount: 300 mg. Indications: Diabetic Peripheral Neuropathy    SILDENAFIL CITRATE (VIAGRA) 100 MG TABLET    Take 1 Tab by mouth as needed. Indications: ERECTILE DYSFUNCTION    THERAPEUTIC MULTIVITAMIN-MINERALS (THERAGRAN-M) TABLET    Take 1 Tab by Mouth Once a Day. THIAMINE (B-1) 100 MG TABLET    100 mg.    These Medications have changed    No medications on file   Stop Taking    No medications on file Render In Strict Bullet Format?: No Continue Regimen: Tretinoin, clindamycin, and spironolactone daily Plan: Patient will call if she needs refills for tretinoin Detail Level: Zone